# Patient Record
Sex: MALE | Race: WHITE | Employment: FULL TIME | URBAN - METROPOLITAN AREA
[De-identification: names, ages, dates, MRNs, and addresses within clinical notes are randomized per-mention and may not be internally consistent; named-entity substitution may affect disease eponyms.]

---

## 2017-03-27 ENCOUNTER — PROCEDURE VISIT (OUTPATIENT)
Dept: CARDIOLOGY CLINIC | Age: 52
End: 2017-03-27

## 2017-03-27 DIAGNOSIS — I50.22 CHRONIC SYSTOLIC HEART FAILURE (HCC): ICD-10-CM

## 2017-03-27 DIAGNOSIS — I42.9 CARDIOMYOPATHY (HCC): Chronic | ICD-10-CM

## 2017-03-27 DIAGNOSIS — Z95.810 AUTOMATIC IMPLANTABLE CARDIOVERTER-DEFIBRILLATOR IN SITU: Primary | ICD-10-CM

## 2017-03-27 PROCEDURE — 93296 REM INTERROG EVL PM/IDS: CPT | Performed by: INTERNAL MEDICINE

## 2017-03-27 PROCEDURE — 93297 REM INTERROG DEV EVAL ICPMS: CPT | Performed by: INTERNAL MEDICINE

## 2017-03-27 PROCEDURE — 93295 DEV INTERROG REMOTE 1/2/MLT: CPT | Performed by: INTERNAL MEDICINE

## 2017-06-27 ENCOUNTER — NURSE ONLY (OUTPATIENT)
Dept: CARDIOLOGY CLINIC | Age: 52
End: 2017-06-27

## 2017-06-27 DIAGNOSIS — I42.9 CARDIOMYOPATHY (HCC): Chronic | ICD-10-CM

## 2017-06-27 DIAGNOSIS — I50.22 CHRONIC SYSTOLIC CONGESTIVE HEART FAILURE (HCC): ICD-10-CM

## 2017-06-27 DIAGNOSIS — Z95.810 AUTOMATIC IMPLANTABLE CARDIOVERTER-DEFIBRILLATOR IN SITU: Primary | ICD-10-CM

## 2017-10-09 ENCOUNTER — PROCEDURE VISIT (OUTPATIENT)
Dept: CARDIOLOGY CLINIC | Age: 52
End: 2017-10-09

## 2017-10-09 DIAGNOSIS — Z95.810 AUTOMATIC IMPLANTABLE CARDIOVERTER-DEFIBRILLATOR IN SITU: Primary | ICD-10-CM

## 2017-10-09 DIAGNOSIS — I42.9 CARDIOMYOPATHY, UNSPECIFIED TYPE (HCC): Chronic | ICD-10-CM

## 2017-10-09 DIAGNOSIS — I50.22 CHRONIC SYSTOLIC HEART FAILURE (HCC): ICD-10-CM

## 2017-10-09 PROCEDURE — 93296 REM INTERROG EVL PM/IDS: CPT | Performed by: INTERNAL MEDICINE

## 2017-10-09 PROCEDURE — 93295 DEV INTERROG REMOTE 1/2/MLT: CPT | Performed by: INTERNAL MEDICINE

## 2017-10-09 PROCEDURE — 93297 REM INTERROG DEV EVAL ICPMS: CPT | Performed by: INTERNAL MEDICINE

## 2017-10-16 ENCOUNTER — TELEPHONE (OUTPATIENT)
Dept: CARDIOLOGY CLINIC | Age: 52
End: 2017-10-16

## 2017-10-16 NOTE — TELEPHONE ENCOUNTER
JACOB for patient that JERRICA has open appts this year in his [facility] office .  If he would like his appt in [facility] or [facility] just let use know and we will put him on a waiting list .

## 2017-10-16 NOTE — TELEPHONE ENCOUNTER
Needs appt in [facility] office for his yearly check up in December or January . Offered sooner but he works out of town .

## 2017-12-21 ENCOUNTER — PROCEDURE VISIT (OUTPATIENT)
Dept: CARDIOLOGY CLINIC | Age: 52
End: 2017-12-21

## 2017-12-21 ENCOUNTER — OFFICE VISIT (OUTPATIENT)
Dept: CARDIOLOGY CLINIC | Age: 52
End: 2017-12-21

## 2017-12-21 VITALS
BODY MASS INDEX: 26.72 KG/M2 | DIASTOLIC BLOOD PRESSURE: 80 MMHG | SYSTOLIC BLOOD PRESSURE: 110 MMHG | HEART RATE: 60 BPM | WEIGHT: 186.2 LBS

## 2017-12-21 DIAGNOSIS — I50.22 CHRONIC SYSTOLIC HEART FAILURE (HCC): ICD-10-CM

## 2017-12-21 DIAGNOSIS — I47.29 PAROXYSMAL VENTRICULAR TACHYCARDIA: Primary | ICD-10-CM

## 2017-12-21 DIAGNOSIS — I50.9 CONGESTIVE HEART FAILURE, UNSPECIFIED CONGESTIVE HEART FAILURE CHRONICITY, UNSPECIFIED CONGESTIVE HEART FAILURE TYPE: ICD-10-CM

## 2017-12-21 DIAGNOSIS — Z95.810 AUTOMATIC IMPLANTABLE CARDIOVERTER-DEFIBRILLATOR IN SITU: ICD-10-CM

## 2017-12-21 PROCEDURE — G8484 FLU IMMUNIZE NO ADMIN: HCPCS | Performed by: INTERNAL MEDICINE

## 2017-12-21 PROCEDURE — 3017F COLORECTAL CA SCREEN DOC REV: CPT | Performed by: INTERNAL MEDICINE

## 2017-12-21 PROCEDURE — G8419 CALC BMI OUT NRM PARAM NOF/U: HCPCS | Performed by: INTERNAL MEDICINE

## 2017-12-21 PROCEDURE — 93284 PRGRMG EVAL IMPLANTABLE DFB: CPT | Performed by: INTERNAL MEDICINE

## 2017-12-21 PROCEDURE — G8427 DOCREV CUR MEDS BY ELIG CLIN: HCPCS | Performed by: INTERNAL MEDICINE

## 2017-12-21 PROCEDURE — 93290 INTERROG DEV EVAL ICPMS IP: CPT | Performed by: INTERNAL MEDICINE

## 2017-12-21 PROCEDURE — 99214 OFFICE O/P EST MOD 30 MIN: CPT | Performed by: INTERNAL MEDICINE

## 2017-12-21 PROCEDURE — 1036F TOBACCO NON-USER: CPT | Performed by: INTERNAL MEDICINE

## 2017-12-21 RX ORDER — CARVEDILOL 12.5 MG/1
TABLET ORAL
Qty: 60 TABLET | Refills: 5 | Status: SHIPPED | OUTPATIENT
Start: 2017-12-21 | End: 2018-03-19 | Stop reason: SDUPTHER

## 2017-12-21 NOTE — PROGRESS NOTES
[address]          Electrophysiology Follow Up      Chief Concerns: NICM, s/p SCA, s/p [employer] Edwin Bi-V ICD    HPI: Lawanda Castro is a 47 y.o. male presenting in the office today for NICM, CHF, ICD follow up. He has hx of cardiac arrest in [place] [facility] with subsequent BiV ICD implantation, HLD, & HTN. Angiogram on 04/20/12 showed normal coronaries. MRI on 04/24/12 showed IDCM, no late enhancement. Echo 6/12/14 showed EF 55%. He travels domestic as well as international frequently due to work. Since the ICD implant, his cardiac function has improved and done well overall. Device is interrogated today. Device is functioning normally. No episode of atrial or ventricular arrhythmia noted since last visit. Est battery life is about 10.5 months. He reports he has felt dizziness with standing, daniel in the evening after work out in past 3-4 month. He also stated he has hydrated himself well daily. He has become more active since this summer. He has lost some weight to achieve healthy lifestyle. He checked his blood pressure while he experienced one of his dizziness spells was around 95/60 mmHg (sitting). He stated his lightheadedness has occurred few times a week and when he stands up. He is currently takes Coreg 25 mg BID, Lisinopril 5 mg QD, ASA 81 mg QD, & Lipitor 40 mg QD. He denies CP, SOB, fatigue, palpitations, or syncope. Past Medical History:   Diagnosis Date    Cardiomyopathy ([address]     Paroxysmal ventricular tachycardia (HCC)      Past Surgical History:   Procedure Laterality Date    HAND SURGERY      2/2015    PACEMAKER INSERTION  04/25/2012    TONSILLECTOMY AND ADENOIDECTOMY      childhood     Family History   Problem Relation Age of Onset    Cancer Mother     Cancer Father      Social History     Social History    Marital status:      Spouse name: N/A    Number of children: N/A    Years of education: N/A     Occupational History    Not on file.      Social History Main Topics    Smoking status: Former Smoker     Quit date: 8/6/2007    Smokeless tobacco: Never Used    Alcohol use Yes      Comment: socially    Drug use: No    Sexual activity: Yes     Partners: Female     Other Topics Concern    Not on file     Social History Narrative    No narrative on file       Review of Systems   Constitutional: Negative. HENT: Negative. Eyes: Negative. Respiratory: Negative. Cardiovascular: Negative for palps. Gastrointestinal: Negative. Genitourinary: Negative. Musculoskeletal: Negative. Skin: Negative. Neurological: Negative. Psychiatric/Behavioral: Negative. Current Outpatient Prescriptions   Medication Sig Dispense Refill    carvedilol (COREG) 25 MG tablet TAKE ONE TABLET BY MOUTH TWICE A DAY WITH MEALS 180 tablet 3    lisinopril (PRINIVIL;ZESTRIL) 5 MG tablet Take 1 tablet by mouth daily 90 tablet 3    atorvastatin (LIPITOR) 40 MG tablet Take 1 tablet by mouth daily 30 tablet 11    aspirin 81 MG tablet Take 1 tablet by mouth daily. 30 tablet 11     No current facility-administered medications for this visit. Objective:   Physical Exam   Constitutional: He is oriented to person, place, and time. He appears well-developed and well-nourished. HENT:   Head: Normocephalic and atraumatic. Eyes: Pupils are equal, round, and reactive to light. Neck: Normal range of motion. Cardiovascular: Normal rate, regular rhythm S4 gallop    Pulmonary/Chest: Effort normal and breath sounds normal.   Abdominal: Soft. There is no tenderness. Musculoskeletal: Normal range of motion. He exhibits no edema. Neurological: He is alert and oriented to person, place, and time. Skin: Skin is warm and dry. Psychiatric: He has a normal mood and affect. /80   Pulse 60   Wt 186 lb 3.2 oz (84.5 kg)   BMI 26.72 kg/m²     EKG 12/21/17. Personally reviewed.     6/12/14 Echo  Summary  Normal left ventricle size, wall thickness and systolic function

## 2017-12-26 RX ORDER — LISINOPRIL 5 MG/1
TABLET ORAL
Qty: 30 TABLET | Refills: 5 | Status: SHIPPED | OUTPATIENT
Start: 2017-12-26 | End: 2017-12-27 | Stop reason: SDUPTHER

## 2017-12-27 RX ORDER — CARVEDILOL 25 MG/1
TABLET ORAL
Qty: 60 TABLET | Refills: 2 | Status: SHIPPED | OUTPATIENT
Start: 2017-12-27 | End: 2018-03-21 | Stop reason: SDUPTHER

## 2017-12-27 RX ORDER — LISINOPRIL 5 MG/1
TABLET ORAL
Qty: 30 TABLET | Refills: 5 | Status: SHIPPED | OUTPATIENT
Start: 2017-12-27 | End: 2018-03-19 | Stop reason: SDUPTHER

## 2018-03-16 ENCOUNTER — HOSPITAL ENCOUNTER (OUTPATIENT)
Dept: NON INVASIVE DIAGNOSTICS | Age: 53
Discharge: HOME | End: 2018-03-16
Attending: INTERNAL MEDICINE | Admitting: INTERNAL MEDICINE

## 2018-03-16 DIAGNOSIS — I50.9 HEART FAILURE (HCC): ICD-10-CM

## 2018-03-16 LAB
LV EF: 48 %
LVEF MODALITY: NORMAL

## 2018-03-19 ENCOUNTER — TELEPHONE (OUTPATIENT)
Dept: CARDIOLOGY CLINIC | Age: 53
End: 2018-03-19

## 2018-03-19 DIAGNOSIS — I50.9 CONGESTIVE HEART FAILURE, UNSPECIFIED CONGESTIVE HEART FAILURE CHRONICITY, UNSPECIFIED CONGESTIVE HEART FAILURE TYPE: ICD-10-CM

## 2018-03-19 DIAGNOSIS — I10 ESSENTIAL HYPERTENSION: Primary | ICD-10-CM

## 2018-03-19 DIAGNOSIS — I47.29 PAROXYSMAL VENTRICULAR TACHYCARDIA: ICD-10-CM

## 2018-03-19 NOTE — TELEPHONE ENCOUNTER
Pt calling for the results of his ECHO? Pt also switched insurance company and needs new scripts for Rx lisinopril (PRINIVIL;ZESTRIL) 5 MG tablet, carvedilol (COREG) 25 MG tablet called into [facility] for refills.  Pls call to advise Thank you

## 2018-03-21 ENCOUNTER — NURSE ONLY (OUTPATIENT)
Dept: CARDIOLOGY CLINIC | Age: 53
End: 2018-03-21

## 2018-03-21 RX ORDER — CARVEDILOL 25 MG/1
25 TABLET ORAL 2 TIMES DAILY
Qty: 180 TABLET | Refills: 2 | Status: SHIPPED | OUTPATIENT
Start: 2018-03-21 | End: 2018-11-14 | Stop reason: SDUPTHER

## 2018-03-21 RX ORDER — LISINOPRIL 5 MG/1
TABLET ORAL
Qty: 90 TABLET | Refills: 2 | Status: SHIPPED | OUTPATIENT
Start: 2018-03-21 | End: 2018-11-14 | Stop reason: SDUPTHER

## 2018-04-03 ENCOUNTER — NURSE ONLY (OUTPATIENT)
Dept: CARDIOLOGY CLINIC | Age: 53
End: 2018-04-03

## 2018-04-03 DIAGNOSIS — I50.22 CHRONIC SYSTOLIC HEART FAILURE (HCC): ICD-10-CM

## 2018-04-03 DIAGNOSIS — Z95.810 AUTOMATIC IMPLANTABLE CARDIOVERTER-DEFIBRILLATOR IN SITU: ICD-10-CM

## 2018-04-03 PROCEDURE — 93296 REM INTERROG EVL PM/IDS: CPT | Performed by: INTERNAL MEDICINE

## 2018-04-03 PROCEDURE — 93297 REM INTERROG DEV EVAL ICPMS: CPT | Performed by: INTERNAL MEDICINE

## 2018-04-03 PROCEDURE — 93295 DEV INTERROG REMOTE 1/2/MLT: CPT | Performed by: INTERNAL MEDICINE

## 2018-07-24 ENCOUNTER — NURSE ONLY (OUTPATIENT)
Dept: CARDIOLOGY CLINIC | Age: 53
End: 2018-07-24

## 2018-07-24 DIAGNOSIS — I50.22 CHRONIC SYSTOLIC HEART FAILURE (HCC): ICD-10-CM

## 2018-07-24 DIAGNOSIS — Z95.810 AUTOMATIC IMPLANTABLE CARDIOVERTER-DEFIBRILLATOR IN SITU: ICD-10-CM

## 2018-07-24 PROCEDURE — 93297 REM INTERROG DEV EVAL ICPMS: CPT | Performed by: INTERNAL MEDICINE

## 2018-07-24 PROCEDURE — 93296 REM INTERROG EVL PM/IDS: CPT | Performed by: INTERNAL MEDICINE

## 2018-07-24 PROCEDURE — 93295 DEV INTERROG REMOTE 1/2/MLT: CPT | Performed by: INTERNAL MEDICINE

## 2018-07-31 ENCOUNTER — TELEPHONE (OUTPATIENT)
Dept: CARDIOLOGY CLINIC | Age: 53
End: 2018-07-31

## 2018-07-31 NOTE — TELEPHONE ENCOUNTER
Pt returning [employer], CORIE Mercado, call. Natali Henriquez currently unavailable at the moment. Pt asked to be called back on his cell:    [phone].

## 2018-09-25 ENCOUNTER — TELEPHONE (OUTPATIENT)
Dept: CARDIOLOGY CLINIC | Age: 53
End: 2018-09-25

## 2018-09-25 NOTE — TELEPHONE ENCOUNTER
Spoke with pt. Likely YAZMIN. Pt to send remote Friday or over weekend (currently in [place]) to verify RRT then will set up ICD replacement procedure after OV currently scheduled with Dr Chanell Chapman 11/5/18.

## 2018-11-05 ENCOUNTER — TELEPHONE (OUTPATIENT)
Dept: CARDIOLOGY CLINIC | Age: 53
End: 2018-11-05

## 2018-11-05 NOTE — TELEPHONE ENCOUNTER
Please call patient at [phone] to go over procedure. He was upset appointment was cancelled and will not make next weeks wound check.

## 2018-11-06 ENCOUNTER — PROCEDURE VISIT (OUTPATIENT)
Dept: CARDIOLOGY CLINIC | Age: 53
End: 2018-11-06
Payer: COMMERCIAL

## 2018-11-06 ENCOUNTER — OFFICE VISIT (OUTPATIENT)
Dept: CARDIOLOGY CLINIC | Age: 53
End: 2018-11-06
Payer: COMMERCIAL

## 2018-11-06 VITALS
BODY MASS INDEX: 27.66 KG/M2 | OXYGEN SATURATION: 93 % | HEIGHT: 70 IN | DIASTOLIC BLOOD PRESSURE: 70 MMHG | HEART RATE: 66 BPM | WEIGHT: 193.2 LBS | SYSTOLIC BLOOD PRESSURE: 100 MMHG

## 2018-11-06 DIAGNOSIS — Z95.810 AUTOMATIC IMPLANTABLE CARDIOVERTER-DEFIBRILLATOR IN SITU: ICD-10-CM

## 2018-11-06 DIAGNOSIS — I50.22 CHRONIC SYSTOLIC HEART FAILURE (HCC): ICD-10-CM

## 2018-11-06 DIAGNOSIS — Z95.810 AUTOMATIC IMPLANTABLE CARDIOVERTER-DEFIBRILLATOR IN SITU: Primary | ICD-10-CM

## 2018-11-06 PROCEDURE — 3017F COLORECTAL CA SCREEN DOC REV: CPT | Performed by: INTERNAL MEDICINE

## 2018-11-06 PROCEDURE — 1036F TOBACCO NON-USER: CPT | Performed by: INTERNAL MEDICINE

## 2018-11-06 PROCEDURE — 93284 PRGRMG EVAL IMPLANTABLE DFB: CPT | Performed by: INTERNAL MEDICINE

## 2018-11-06 PROCEDURE — G8427 DOCREV CUR MEDS BY ELIG CLIN: HCPCS | Performed by: INTERNAL MEDICINE

## 2018-11-06 PROCEDURE — 93290 INTERROG DEV EVAL ICPMS IP: CPT | Performed by: INTERNAL MEDICINE

## 2018-11-06 PROCEDURE — G8419 CALC BMI OUT NRM PARAM NOF/U: HCPCS | Performed by: INTERNAL MEDICINE

## 2018-11-06 PROCEDURE — 99214 OFFICE O/P EST MOD 30 MIN: CPT | Performed by: INTERNAL MEDICINE

## 2018-11-06 PROCEDURE — G8484 FLU IMMUNIZE NO ADMIN: HCPCS | Performed by: INTERNAL MEDICINE

## 2018-11-06 NOTE — PROGRESS NOTES
Quit date: 8/6/2007    Smokeless tobacco: Never Used    Alcohol use Yes      Comment: socially    Drug use: No    Sexual activity: Yes     Partners: Female     Other Topics Concern    Not on file     Social History Narrative    No narrative on file       Review of Systems   Constitutional: Negative. HENT: Negative. Eyes: Negative. Respiratory: Negative. Cardiovascular: Negative for palps. Gastrointestinal: Negative. Genitourinary: Negative. Musculoskeletal: Negative. Skin: Negative. Neurological: Negative. Psychiatric/Behavioral: Negative. Current Outpatient Prescriptions   Medication Sig Dispense Refill    Multiple Vitamins-Minerals (MULTIVITAMIN ADULTS PO) Take by mouth daily      lisinopril (PRINIVIL;ZESTRIL) 5 MG tablet TAKE ONE TABLET BY MOUTH DAILY 90 tablet 2    carvedilol (COREG) 25 MG tablet Take 1 tablet by mouth 2 times daily TAKE ONE TABLET BY MOUTH TWICE A DAY WITH MEALS 180 tablet 2    atorvastatin (LIPITOR) 40 MG tablet Take 1 tablet by mouth daily 30 tablet 11    aspirin 81 MG tablet Take 1 tablet by mouth daily. 30 tablet 11     No current facility-administered medications for this visit. Objective:   Physical Exam   Constitutional: He is oriented to person, place, and time. He appears well-developed and well-nourished. HENT:   Head: Normocephalic and atraumatic. Eyes: Pupils are equal, round, and reactive to light. Neck: Normal range of motion. Cardiovascular: Normal rate, regular rhythm S4 gallop    Pulmonary/Chest: Effort normal and breath sounds normal.   Abdominal: Soft. There is no tenderness. Musculoskeletal: Normal range of motion. He exhibits no edema. Neurological: He is alert and oriented to person, place, and time. Skin: Skin is warm and dry. Psychiatric: He has a normal mood and affect.    /70 (Site: Left Upper Arm, Position: Sitting, Cuff Size: Medium Adult)   Pulse 66   Ht 5' 10\" (1.778 m)   Wt 193 lb 3.2

## 2018-11-08 ENCOUNTER — HOSPITAL ENCOUNTER (OUTPATIENT)
Dept: CARDIAC CATH/INVASIVE PROCEDURES | Age: 53
Discharge: HOME | End: 2018-11-08
Attending: INTERNAL MEDICINE | Admitting: INTERNAL MEDICINE
Payer: COMMERCIAL

## 2018-11-08 VITALS — HEIGHT: 70 IN | WEIGHT: 183 LBS | TEMPERATURE: 97 F | BODY MASS INDEX: 26.2 KG/M2 | OXYGEN SATURATION: 97 %

## 2018-11-08 LAB
ANION GAP SERPL CALCULATED.3IONS-SCNC: 12 MMOL/L (ref 3–16)
BUN BLDV-MCNC: 15 MG/DL (ref 7–20)
CALCIUM SERPL-MCNC: 10 MG/DL (ref 8.3–10.6)
CHLORIDE BLD-SCNC: 101 MMOL/L (ref 99–110)
CO2: 25 MMOL/L (ref 21–32)
CREAT SERPL-MCNC: 0.7 MG/DL (ref 0.9–1.3)
GFR AFRICAN AMERICAN: >60
GFR NON-AFRICAN AMERICAN: >60
GLUCOSE BLD-MCNC: 119 MG/DL (ref 70–99)
HCT VFR BLD CALC: 45.9 % (ref 40.5–52.5)
HEMOGLOBIN: 15.6 G/DL (ref 13.5–17.5)
INR BLD: 0.95 (ref 0.86–1.14)
MCH RBC QN AUTO: 29.7 PG (ref 26–34)
MCHC RBC AUTO-ENTMCNC: 34 G/DL (ref 31–36)
MCV RBC AUTO: 87.4 FL (ref 80–100)
PDW BLD-RTO: 13.3 % (ref 12.4–15.4)
PERFORMED ON: ABNORMAL
PLATELET # BLD: 151 K/UL (ref 135–450)
PMV BLD AUTO: 10.1 FL (ref 5–10.5)
POC POTASSIUM: 8.9 MMOL/L (ref 3.5–5.1)
POC SAMPLE TYPE: ABNORMAL
POTASSIUM REFLEX MAGNESIUM: 6.5 MMOL/L (ref 3.5–5.1)
POTASSIUM SERPL-SCNC: 5.3 MMOL/L (ref 3.5–5.1)
PROTHROMBIN TIME: 10.8 SEC (ref 9.8–13)
RBC # BLD: 5.26 M/UL (ref 4.2–5.9)
SODIUM BLD-SCNC: 138 MMOL/L (ref 136–145)
WBC # BLD: 5.2 K/UL (ref 4–11)

## 2018-11-08 PROCEDURE — 36415 COLL VENOUS BLD VENIPUNCTURE: CPT

## 2018-11-08 PROCEDURE — 99152 MOD SED SAME PHYS/QHP 5/>YRS: CPT | Performed by: INTERNAL MEDICINE

## 2018-11-08 PROCEDURE — 93010 ELECTROCARDIOGRAM REPORT: CPT | Performed by: INTERNAL MEDICINE

## 2018-11-08 PROCEDURE — C1882 AICD, OTHER THAN SING/DUAL: HCPCS

## 2018-11-08 PROCEDURE — C1781 MESH (IMPLANTABLE): HCPCS

## 2018-11-08 PROCEDURE — 84132 ASSAY OF SERUM POTASSIUM: CPT

## 2018-11-08 PROCEDURE — 93005 ELECTROCARDIOGRAM TRACING: CPT | Performed by: INTERNAL MEDICINE

## 2018-11-08 PROCEDURE — 94664 DEMO&/EVAL PT USE INHALER: CPT

## 2018-11-08 PROCEDURE — 99153 MOD SED SAME PHYS/QHP EA: CPT | Performed by: INTERNAL MEDICINE

## 2018-11-08 PROCEDURE — 94770 HC ETCO2 MONITOR DAILY: CPT

## 2018-11-08 PROCEDURE — 85027 COMPLETE CBC AUTOMATED: CPT

## 2018-11-08 PROCEDURE — 80048 BASIC METABOLIC PNL TOTAL CA: CPT

## 2018-11-08 PROCEDURE — 33264 RMVL & RPLCMT DFB GEN MLT LD: CPT | Performed by: INTERNAL MEDICINE

## 2018-11-08 PROCEDURE — 2700000000 HC OXYGEN THERAPY PER DAY

## 2018-11-08 PROCEDURE — 85610 PROTHROMBIN TIME: CPT

## 2018-11-08 PROCEDURE — 94760 N-INVAS EAR/PLS OXIMETRY 1: CPT

## 2018-11-08 RX ORDER — SODIUM CHLORIDE 0.9 % (FLUSH) 0.9 %
10 SYRINGE (ML) INJECTION PRN
Status: DISCONTINUED | OUTPATIENT
Start: 2018-11-08 | End: 2018-11-08 | Stop reason: HOSPADM

## 2018-11-08 RX ORDER — SODIUM CHLORIDE 0.9 % (FLUSH) 0.9 %
10 SYRINGE (ML) INJECTION EVERY 12 HOURS SCHEDULED
Status: DISCONTINUED | OUTPATIENT
Start: 2018-11-08 | End: 2018-11-08 | Stop reason: HOSPADM

## 2018-11-08 RX ORDER — CEFAZOLIN SODIUM 1 G/3ML
2 INJECTION, POWDER, FOR SOLUTION INTRAMUSCULAR; INTRAVENOUS ONCE
Status: DISCONTINUED | OUTPATIENT
Start: 2018-11-08 | End: 2018-11-08

## 2018-11-08 RX ORDER — SODIUM CHLORIDE 9 MG/ML
INJECTION, SOLUTION INTRAVENOUS CONTINUOUS
Status: DISCONTINUED | OUTPATIENT
Start: 2018-11-08 | End: 2018-11-08 | Stop reason: HOSPADM

## 2018-11-08 RX ORDER — CEFAZOLIN SODIUM 1 G/3ML
1 INJECTION, POWDER, FOR SOLUTION INTRAMUSCULAR; INTRAVENOUS ONCE
Status: DISCONTINUED | OUTPATIENT
Start: 2018-11-08 | End: 2018-11-08

## 2018-11-08 NOTE — PROGRESS NOTES
ETCO2  Monitoring done for conscious sedation. Patient is on 3 liters/min via nasal cannula for procedure. Baseline information:  HR: 70 BP: 138/102  RR: 18 LOC: alert  ETCO2: 34 SpO2: 97    5 minutes after sedation administered:  HR: 76 BP: 138/102  RR: 20 LOC: lethargic  ETCO2: 36 SpO2: 98    10 min after sedation administered:  HR: 78 BP: 133/93  RR: 18 LOC: lethargic  ETCO2: 27 SpO2: 94    Pt procedure on pause while waiting for sedation. Pt awake while waiting.     5 minutes after sedation administered:  HR: 76 BP: 143/99  RR: 12 LOC:lethargic  ETCO2: 26 SpO2: 97    10 minutes after sedation administered:  HR: 48 BP: 123/89  RR: 20 LOC: lethargic  ETCO2: 29 SpO2: 96      Post-Procedure:  HR: 74 BP: 148/109  RR: 23 LOC: alert  ETCO2: 33 SpO2: 95    well                      Patient/caregiver was educated on the proper method of use:  Yes      Level of patient/caregiver understanding able to:   [x] Verbalize understanding   [] Demonstrate understanding       [] Teach back        [] Needs reinforcement       []  No available caregiver               []  Other:     Response to education:  Excellent

## 2018-11-12 LAB
EKG ATRIAL RATE: 64 BPM
EKG DIAGNOSIS: NORMAL
EKG P AXIS: 70 DEGREES
EKG Q-T INTERVAL: 446 MS
EKG QRS DURATION: 152 MS
EKG QTC CALCULATION (BAZETT): 460 MS
EKG R AXIS: -33 DEGREES
EKG T AXIS: 200 DEGREES
EKG VENTRICULAR RATE: 64 BPM

## 2018-11-14 DIAGNOSIS — I47.29 PAROXYSMAL VENTRICULAR TACHYCARDIA: ICD-10-CM

## 2018-11-14 DIAGNOSIS — I10 ESSENTIAL HYPERTENSION: ICD-10-CM

## 2018-11-14 RX ORDER — LISINOPRIL 5 MG/1
TABLET ORAL
Qty: 90 TABLET | Refills: 3 | Status: SHIPPED | OUTPATIENT
Start: 2018-11-14 | End: 2019-09-23 | Stop reason: SDUPTHER

## 2018-11-14 RX ORDER — CARVEDILOL 25 MG/1
25 TABLET ORAL 2 TIMES DAILY
Qty: 180 TABLET | Refills: 3 | Status: SHIPPED | OUTPATIENT
Start: 2018-11-14 | End: 2019-09-23 | Stop reason: SDUPTHER

## 2018-11-19 ENCOUNTER — PROCEDURE VISIT (OUTPATIENT)
Dept: CARDIOLOGY CLINIC | Age: 53
End: 2018-11-19
Payer: COMMERCIAL

## 2018-11-19 DIAGNOSIS — I50.22 CHRONIC SYSTOLIC HEART FAILURE (HCC): ICD-10-CM

## 2018-11-19 DIAGNOSIS — Z95.810 AUTOMATIC IMPLANTABLE CARDIOVERTER-DEFIBRILLATOR IN SITU: ICD-10-CM

## 2018-11-19 PROCEDURE — 93284 PRGRMG EVAL IMPLANTABLE DFB: CPT | Performed by: INTERNAL MEDICINE

## 2018-12-19 NOTE — PROGRESS NOTES
echo in 2014  - Echo In March 2019 before he sees Dr. Jeff Gutierrez  -  paced  - NYHA class I  - On carvedilol 25 mg BID, lisinopril 5 mg daily  - S/p [facility] CRT-D (2012, [place])  - Devic check today shows  7 percent AP, 98% biventricular pacing, no arrhythmias, other parameters stable. - ECG ordered and results personally reviewed     EF of 53-21%  ACEi for systolic HF  ASA and Statin for CAD  No known AF   No Tobacco use. All questions and concerns were addressed to the patient/family. Alternatives to my treatment were discussed. The note was completed using EMR. Every effort was made to ensure accuracy; however, inadvertent computerized transcription errors may be present. Patient received education regarding their diagnosis, treatment and medications while in the office today.       Sejal Cash [address]

## 2018-12-20 ENCOUNTER — PROCEDURE VISIT (OUTPATIENT)
Dept: CARDIOLOGY CLINIC | Age: 53
End: 2018-12-20
Payer: COMMERCIAL

## 2018-12-20 ENCOUNTER — OFFICE VISIT (OUTPATIENT)
Dept: CARDIOLOGY CLINIC | Age: 53
End: 2018-12-20
Payer: COMMERCIAL

## 2018-12-20 VITALS
BODY MASS INDEX: 27.55 KG/M2 | OXYGEN SATURATION: 93 % | HEART RATE: 60 BPM | SYSTOLIC BLOOD PRESSURE: 124 MMHG | DIASTOLIC BLOOD PRESSURE: 70 MMHG | WEIGHT: 192 LBS

## 2018-12-20 DIAGNOSIS — I42.8 NONISCHEMIC CARDIOMYOPATHY (HCC): Primary | ICD-10-CM

## 2018-12-20 DIAGNOSIS — Z95.810 AUTOMATIC IMPLANTABLE CARDIOVERTER-DEFIBRILLATOR IN SITU: ICD-10-CM

## 2018-12-20 DIAGNOSIS — Z95.810 ICD (IMPLANTABLE CARDIOVERTER-DEFIBRILLATOR), BIVENTRICULAR, IN SITU: ICD-10-CM

## 2018-12-20 DIAGNOSIS — I47.29 NONSUSTAINED VENTRICULAR TACHYCARDIA: ICD-10-CM

## 2018-12-20 DIAGNOSIS — I50.22 CHRONIC SYSTOLIC HEART FAILURE (HCC): ICD-10-CM

## 2018-12-20 PROCEDURE — G8427 DOCREV CUR MEDS BY ELIG CLIN: HCPCS | Performed by: NURSE PRACTITIONER

## 2018-12-20 PROCEDURE — G8419 CALC BMI OUT NRM PARAM NOF/U: HCPCS | Performed by: NURSE PRACTITIONER

## 2018-12-20 PROCEDURE — 1036F TOBACCO NON-USER: CPT | Performed by: NURSE PRACTITIONER

## 2018-12-20 PROCEDURE — 93284 PRGRMG EVAL IMPLANTABLE DFB: CPT | Performed by: INTERNAL MEDICINE

## 2018-12-20 PROCEDURE — 93290 INTERROG DEV EVAL ICPMS IP: CPT | Performed by: INTERNAL MEDICINE

## 2018-12-20 PROCEDURE — 93000 ELECTROCARDIOGRAM COMPLETE: CPT | Performed by: NURSE PRACTITIONER

## 2018-12-20 PROCEDURE — G8484 FLU IMMUNIZE NO ADMIN: HCPCS | Performed by: NURSE PRACTITIONER

## 2018-12-20 PROCEDURE — 3017F COLORECTAL CA SCREEN DOC REV: CPT | Performed by: NURSE PRACTITIONER

## 2018-12-20 PROCEDURE — 99214 OFFICE O/P EST MOD 30 MIN: CPT | Performed by: NURSE PRACTITIONER

## 2019-03-13 ENCOUNTER — HOSPITAL ENCOUNTER (OUTPATIENT)
Dept: NON INVASIVE DIAGNOSTICS | Age: 54
Discharge: HOME | End: 2019-03-13
Payer: COMMERCIAL

## 2019-03-13 DIAGNOSIS — Z95.810 ICD (IMPLANTABLE CARDIOVERTER-DEFIBRILLATOR), BIVENTRICULAR, IN SITU: ICD-10-CM

## 2019-03-13 DIAGNOSIS — I47.29 NONSUSTAINED VENTRICULAR TACHYCARDIA: ICD-10-CM

## 2019-03-13 DIAGNOSIS — I42.8 NONISCHEMIC CARDIOMYOPATHY (HCC): ICD-10-CM

## 2019-03-13 LAB
LV EF: 50 %
LVEF MODALITY: NORMAL

## 2019-03-13 PROCEDURE — 93306 TTE W/DOPPLER COMPLETE: CPT

## 2019-04-02 NOTE — PROGRESS NOTES
Last attempt to quit: 2007     Years since quittin.6    Smokeless tobacco: Never Used   Substance and Sexual Activity    Alcohol use: Yes     Comment: socially    Drug use: No    Sexual activity: Yes     Partners: Female   Lifestyle    Physical activity:     Days per week: Not on file     Minutes per session: Not on file    Stress: Not on file   Relationships    Social connections:     Talks on phone: Not on file     Gets together: Not on file     Attends Yazdanism service: Not on file     Active member of club or organization: Not on file     Attends meetings of clubs or organizations: Not on file     Relationship status: Not on file    Intimate partner violence:     Fear of current or ex partner: Not on file     Emotionally abused: Not on file     Physically abused: Not on file     Forced sexual activity: Not on file   Other Topics Concern    Not on file   Social History Narrative    Not on file       Review of Systems   Constitutional: Negative. HENT: Negative. Eyes: Negative. Respiratory: Negative. Cardiovascular: Negative for palps. Gastrointestinal: Negative. Genitourinary: Negative. Musculoskeletal: Negative. Skin: Negative. Neurological: Negative. Psychiatric/Behavioral: Negative. Current Outpatient Medications   Medication Sig Dispense Refill    lisinopril (PRINIVIL;ZESTRIL) 5 MG tablet TAKE ONE TABLET BY MOUTH DAILY 90 tablet 3    carvedilol (COREG) 25 MG tablet Take 1 tablet by mouth 2 times daily TAKE ONE TABLET BY MOUTH TWICE A DAY WITH MEALS 180 tablet 3    Multiple Vitamins-Minerals (MULTIVITAMIN ADULTS PO) Take by mouth daily      atorvastatin (LIPITOR) 40 MG tablet Take 1 tablet by mouth daily 30 tablet 11    aspirin 81 MG tablet Take 1 tablet by mouth daily. 30 tablet 11     No current facility-administered medications for this visit. Objective:   Physical Exam   Constitutional: He is oriented to person, place, and time.  He appears well-developed and well-nourished. HENT:   Head: Normocephalic and atraumatic. Eyes: Pupils are equal, round, and reactive to light. Neck: Normal range of motion. Cardiovascular: Normal rate, regular rhythm S4 gallop    Pulmonary/Chest: Effort normal and breath sounds normal.   Abdominal: Soft. There is no tenderness. Musculoskeletal: Normal range of motion. He exhibits no edema. Neurological: He is alert and oriented to person, place, and time. Skin: Skin is warm and dry. Psychiatric: He has a normal mood and affect. /60   Pulse 73   Ht 5' 10\" (1.778 m)   Wt 193 lb 9.6 oz (87.8 kg)   BMI 27.78 kg/m²     ECG 4/4/19, Personally reviewed. Echo 3/13/19  Summary   Left ventricular cavity size is normal.   There is asymmetric hypertrophy of the septum with normal LV systolic   function.   Ejection fraction is visually estimated to be 50%.   Normal diastolic function.   Pacer / ICD wire is visualized in the right ventricle.   The right ventricle is borderline enlarged with normal function.   Mild tricuspid regurgitation.       Assessment:      1. NICM- improved. Echo (6/12/14) EF 55%. On Coreg 25 mg BID, Lisinopril 5 mg QD. 2. S/P SCA. 3. Bi-V-ICD- with normal function. S/p generator change 11/2018, healing well. Plan:      1. Continue meds as ordered  2. Continue activity as tolerated  3. Continue with remote home transmissions every 3 months  4. Follow up in 1 year with device check      I, Rula Valenzuela RN, am scribing for and in the presence of Dr. Bandar Cool. 04/04/19 10:31 AM  Rula Valenzuela RN    I, Dr. Bandar Cool, personally performed the services described in this documentation as scribed by Rula Valenzuela RN in my presence, and it is both accurate and complete.       Madelin Mario M.D.

## 2019-04-04 ENCOUNTER — OFFICE VISIT (OUTPATIENT)
Dept: CARDIOLOGY CLINIC | Age: 54
End: 2019-04-04
Payer: COMMERCIAL

## 2019-04-04 ENCOUNTER — PROCEDURE VISIT (OUTPATIENT)
Dept: CARDIOLOGY CLINIC | Age: 54
End: 2019-04-04
Payer: COMMERCIAL

## 2019-04-04 VITALS
SYSTOLIC BLOOD PRESSURE: 120 MMHG | HEIGHT: 70 IN | WEIGHT: 193.6 LBS | BODY MASS INDEX: 27.72 KG/M2 | HEART RATE: 73 BPM | DIASTOLIC BLOOD PRESSURE: 60 MMHG

## 2019-04-04 DIAGNOSIS — Z95.810 ICD (IMPLANTABLE CARDIOVERTER-DEFIBRILLATOR), BIVENTRICULAR, IN SITU: ICD-10-CM

## 2019-04-04 DIAGNOSIS — Z95.810 AUTOMATIC IMPLANTABLE CARDIOVERTER-DEFIBRILLATOR IN SITU: ICD-10-CM

## 2019-04-04 DIAGNOSIS — I50.22 CHRONIC SYSTOLIC HEART FAILURE (HCC): ICD-10-CM

## 2019-04-04 DIAGNOSIS — I10 ESSENTIAL HYPERTENSION: ICD-10-CM

## 2019-04-04 DIAGNOSIS — I50.22 CHRONIC SYSTOLIC CONGESTIVE HEART FAILURE (HCC): ICD-10-CM

## 2019-04-04 DIAGNOSIS — I47.29 NONSUSTAINED VENTRICULAR TACHYCARDIA: ICD-10-CM

## 2019-04-04 DIAGNOSIS — I42.8 NONISCHEMIC CARDIOMYOPATHY (HCC): Primary | ICD-10-CM

## 2019-04-04 PROCEDURE — 93290 INTERROG DEV EVAL ICPMS IP: CPT | Performed by: INTERNAL MEDICINE

## 2019-04-04 PROCEDURE — G8419 CALC BMI OUT NRM PARAM NOF/U: HCPCS | Performed by: INTERNAL MEDICINE

## 2019-04-04 PROCEDURE — 93000 ELECTROCARDIOGRAM COMPLETE: CPT | Performed by: INTERNAL MEDICINE

## 2019-04-04 PROCEDURE — G8427 DOCREV CUR MEDS BY ELIG CLIN: HCPCS | Performed by: INTERNAL MEDICINE

## 2019-04-04 PROCEDURE — 93284 PRGRMG EVAL IMPLANTABLE DFB: CPT | Performed by: INTERNAL MEDICINE

## 2019-04-04 PROCEDURE — 1036F TOBACCO NON-USER: CPT | Performed by: INTERNAL MEDICINE

## 2019-04-04 PROCEDURE — 3017F COLORECTAL CA SCREEN DOC REV: CPT | Performed by: INTERNAL MEDICINE

## 2019-04-04 PROCEDURE — 99214 OFFICE O/P EST MOD 30 MIN: CPT | Performed by: INTERNAL MEDICINE

## 2019-06-19 ENCOUNTER — TELEPHONE (OUTPATIENT)
Dept: CARDIOLOGY CLINIC | Age: 54
End: 2019-06-19

## 2019-07-02 ENCOUNTER — NURSE ONLY (OUTPATIENT)
Dept: CARDIOLOGY CLINIC | Age: 54
End: 2019-07-02
Payer: COMMERCIAL

## 2019-07-02 DIAGNOSIS — Z95.810 AUTOMATIC IMPLANTABLE CARDIOVERTER-DEFIBRILLATOR IN SITU: ICD-10-CM

## 2019-07-02 DIAGNOSIS — I50.22 CHRONIC SYSTOLIC HEART FAILURE (HCC): ICD-10-CM

## 2019-07-02 DIAGNOSIS — I42.8 NONISCHEMIC CARDIOMYOPATHY (HCC): Primary | ICD-10-CM

## 2019-07-02 DIAGNOSIS — I47.29 NONSUSTAINED VENTRICULAR TACHYCARDIA: ICD-10-CM

## 2019-07-09 PROCEDURE — 93296 REM INTERROG EVL PM/IDS: CPT | Performed by: INTERNAL MEDICINE

## 2019-07-09 PROCEDURE — 93295 DEV INTERROG REMOTE 1/2/MLT: CPT | Performed by: INTERNAL MEDICINE

## 2019-07-09 PROCEDURE — 93297 REM INTERROG DEV EVAL ICPMS: CPT | Performed by: INTERNAL MEDICINE

## 2019-09-23 DIAGNOSIS — I10 ESSENTIAL HYPERTENSION: ICD-10-CM

## 2019-09-23 DIAGNOSIS — I47.29 PAROXYSMAL VENTRICULAR TACHYCARDIA: ICD-10-CM

## 2019-09-23 RX ORDER — LISINOPRIL 5 MG/1
TABLET ORAL
Qty: 90 TABLET | Refills: 3 | Status: SHIPPED | OUTPATIENT
Start: 2019-09-23 | End: 2020-08-27 | Stop reason: SDUPTHER

## 2019-09-23 RX ORDER — CARVEDILOL 25 MG/1
25 TABLET ORAL 2 TIMES DAILY
Qty: 180 TABLET | Refills: 3 | Status: SHIPPED | OUTPATIENT
Start: 2019-09-23 | End: 2020-08-27 | Stop reason: SDUPTHER

## 2019-10-22 ENCOUNTER — NURSE ONLY (OUTPATIENT)
Dept: CARDIOLOGY CLINIC | Age: 54
End: 2019-10-22
Payer: COMMERCIAL

## 2019-10-22 DIAGNOSIS — I42.8 NONISCHEMIC CARDIOMYOPATHY (HCC): ICD-10-CM

## 2019-10-22 DIAGNOSIS — I47.29 NONSUSTAINED VENTRICULAR TACHYCARDIA: ICD-10-CM

## 2019-10-22 DIAGNOSIS — Z95.810 AUTOMATIC IMPLANTABLE CARDIOVERTER-DEFIBRILLATOR IN SITU: ICD-10-CM

## 2019-10-22 DIAGNOSIS — I50.22 CHRONIC SYSTOLIC HEART FAILURE (HCC): ICD-10-CM

## 2019-10-22 PROCEDURE — 93296 REM INTERROG EVL PM/IDS: CPT | Performed by: INTERNAL MEDICINE

## 2019-10-22 PROCEDURE — 93297 REM INTERROG DEV EVAL ICPMS: CPT | Performed by: INTERNAL MEDICINE

## 2019-10-22 PROCEDURE — 93295 DEV INTERROG REMOTE 1/2/MLT: CPT | Performed by: INTERNAL MEDICINE

## 2020-01-21 ENCOUNTER — NURSE ONLY (OUTPATIENT)
Dept: CARDIOLOGY CLINIC | Age: 55
End: 2020-01-21
Payer: COMMERCIAL

## 2020-01-21 PROCEDURE — 93296 REM INTERROG EVL PM/IDS: CPT | Performed by: INTERNAL MEDICINE

## 2020-01-21 PROCEDURE — 93297 REM INTERROG DEV EVAL ICPMS: CPT | Performed by: INTERNAL MEDICINE

## 2020-01-21 PROCEDURE — 93295 DEV INTERROG REMOTE 1/2/MLT: CPT | Performed by: INTERNAL MEDICINE

## 2020-07-01 NOTE — PROGRESS NOTES
[address]          Electrophysiology Follow Up      Chief Concerns: NICM, s/p SCA, s/p [facility] [employer] Bi-V ICD    HPI: Peterson Campbell is a 63 y.o. male presenting in the office today for NICM, CHF, ICD follow up. PMH of HTN, HLD, and cardiac arrest in [place] with subsequent BiV ICD implantation in [address] [place] [address] in 4/2012 showed normal coronaries. MRI on 4/24/12 showed IDCM, no late enhancement. Echo 6/12/14 showed EF 55%. He travels domestically and internationally frequently due to work. Since the ICD implant, his cardiac function has improved and done well overall. S/p generator change on 11/8/18. Currently on Coreg 25 mg BID, Lisinopril 5 mg daily, ASA 81 mg daily, & Lipitor 40 mg daily. Device interrogation shows normal function of BiV ICD. BiVP 97%, AP 18%. No atrial or ventricular arrhythmias noted. YAZMIN at about 4.3 yrs. Denies complaints of palpitations, CP, SOB, orthopnea, presyncope, or syncope. He does complain of some lightheadedness in the evenings, mostly with postural changes. His SBP at those times is usually in the 90's. All other times, his SBP runs in the 110's. He is active, frequently riding his bike with an average of about 16 miles/ride. These rides include some hills and he reports good exercise tolerance. He has lost about 6 lbs over the past year.     Past Medical History:   Diagnosis Date    Cardiomyopathy ([address]     Hyperlipidemia     Hypertension     Paroxysmal ventricular tachycardia (HCC)      Past Surgical History:   Procedure Laterality Date    HAND SURGERY      2/2015    PACEMAKER INSERTION  04/25/2012    TONSILLECTOMY AND ADENOIDECTOMY      childhood     Family History   Problem Relation Age of Onset    Cancer Mother     Cancer Father      Social History     Socioeconomic History    Marital status:      Spouse name: Not on file    Number of children: Not on file    Years of education: Not on file    Highest education level: Not on file Occupational History    Not on file   Social Needs    Financial resource strain: Not on file    Food insecurity     Worry: Not on file     Inability: Not on file    Transportation needs     Medical: Not on file     Non-medical: Not on file   Tobacco Use    Smoking status: Former Smoker     Last attempt to quit: 2007     Years since quittin.9    Smokeless tobacco: Never Used   Substance and Sexual Activity    Alcohol use: Yes     Comment: socially    Drug use: No    Sexual activity: Yes     Partners: Female   Lifestyle    Physical activity     Days per week: Not on file     Minutes per session: Not on file    Stress: Not on file   Relationships    Social connections     Talks on phone: Not on file     Gets together: Not on file     Attends [employer] service: Not on file     Active member of club or organization: Not on file     Attends meetings of clubs or organizations: Not on file     Relationship status: Not on file    Intimate partner violence     Fear of current or ex partner: Not on file     Emotionally abused: Not on file     Physically abused: Not on file     Forced sexual activity: Not on file   Other Topics Concern    Not on file   Social History Narrative    Not on file       Review of Systems   Constitutional: Negative. HENT: Negative. Eyes: Negative. Respiratory: Negative. Cardiovascular: Negative for palps. Gastrointestinal: Negative. Genitourinary: Negative. Musculoskeletal: Negative. Skin: Negative. Neurological: Negative. Psychiatric/Behavioral: Negative.       Current Outpatient Medications   Medication Sig Dispense Refill    lisinopril (PRINIVIL;ZESTRIL) 5 MG tablet TAKE ONE TABLET BY MOUTH DAILY 90 tablet 3    carvedilol (COREG) 25 MG tablet Take 1 tablet by mouth 2 times daily 180 tablet 3    Multiple Vitamins-Minerals (MULTIVITAMIN ADULTS PO) Take by mouth daily      atorvastatin (LIPITOR) 40 MG tablet Take 1 tablet by mouth daily 30 tablet 11    aspirin 81 MG tablet Take 1 tablet by mouth daily. 30 tablet 11     No current facility-administered medications for this visit. Objective:   Physical Exam   Constitutional: He is oriented to person, place, and time. He appears well-developed and well-nourished. HENT:   Head: Normocephalic and atraumatic. Eyes: Pupils are equal, round, and reactive to light. Neck: Normal range of motion. Cardiovascular: Normal rate, regular rhythm S4 gallop    Pulmonary/Chest: Effort normal and breath sounds normal.   Abdominal: Soft. There is no tenderness. Musculoskeletal: Normal range of motion. He exhibits no edema. Neurological: He is alert and oriented to person, place, and time. Skin: Skin is warm and dry. Psychiatric: He has a normal mood and affect. BP 96/66 (Site: Right Upper Arm, Position: Supine, Cuff Size: Medium Adult)   Pulse 66   Temp 97.7 °F (36.5 °C)   Ht 5' 10\" (1.778 m)   Wt 187 lb 12.8 oz (85.2 kg)   BMI 26.95 kg/m²     ECG 7/6/20, Personally reviewed. Echo 3/13/19  Summary   Left ventricular cavity size is normal.   There is asymmetric hypertrophy of the septum with normal LV systolic   function.   Ejection fraction is visually estimated to be 50%.   Normal diastolic function.   Pacer / ICD wire is visualized in the right ventricle.   The right ventricle is borderline enlarged with normal function.   Mild tricuspid regurgitation.       Assessment:      1. NICM- improved. Echo (6/12/14) EF 55%. On Coreg 25 mg BID and Lisinopril 5 mg QD. Complains of infrequent episodes of dizziness, mostly with postural changes. 2. S/P SCA  3. Bi-V-ICD- with normal function today. S/p generator change 11/2018.  97%       Plan:      1. Continue current medications  2. Continue activity as tolerated, but encouraged good hydration  3. Continue with remote home transmissions every 3 months  4.  Follow up in 1 year with device check     I, Tomasz Armando RN, am scribing for and in the presence of Dr. Randa Ann. 07/06/20 9:32 AM  Rich Wright RN    I, Dr. Randa Ann, personally performed the services described in this documentation as scribed by Rich Wright RN in my presence, and it is both accurate and complete.       Franci Samson M.D.

## 2020-07-06 ENCOUNTER — OFFICE VISIT (OUTPATIENT)
Dept: CARDIOLOGY CLINIC | Age: 55
End: 2020-07-06
Payer: COMMERCIAL

## 2020-07-06 ENCOUNTER — NURSE ONLY (OUTPATIENT)
Dept: CARDIOLOGY CLINIC | Age: 55
End: 2020-07-06
Payer: COMMERCIAL

## 2020-07-06 VITALS
BODY MASS INDEX: 26.88 KG/M2 | HEART RATE: 66 BPM | TEMPERATURE: 97.7 F | SYSTOLIC BLOOD PRESSURE: 96 MMHG | DIASTOLIC BLOOD PRESSURE: 66 MMHG | WEIGHT: 187.8 LBS | HEIGHT: 70 IN

## 2020-07-06 PROCEDURE — 93000 ELECTROCARDIOGRAM COMPLETE: CPT | Performed by: INTERNAL MEDICINE

## 2020-07-06 PROCEDURE — 99214 OFFICE O/P EST MOD 30 MIN: CPT | Performed by: INTERNAL MEDICINE

## 2020-07-06 PROCEDURE — 1036F TOBACCO NON-USER: CPT | Performed by: INTERNAL MEDICINE

## 2020-07-06 PROCEDURE — 93284 PRGRMG EVAL IMPLANTABLE DFB: CPT | Performed by: INTERNAL MEDICINE

## 2020-07-06 PROCEDURE — 3017F COLORECTAL CA SCREEN DOC REV: CPT | Performed by: INTERNAL MEDICINE

## 2020-07-06 PROCEDURE — G8428 CUR MEDS NOT DOCUMENT: HCPCS | Performed by: INTERNAL MEDICINE

## 2020-07-06 PROCEDURE — 93290 INTERROG DEV EVAL ICPMS IP: CPT | Performed by: INTERNAL MEDICINE

## 2020-07-06 PROCEDURE — G8419 CALC BMI OUT NRM PARAM NOF/U: HCPCS | Performed by: INTERNAL MEDICINE

## 2020-08-27 RX ORDER — CARVEDILOL 25 MG/1
25 TABLET ORAL 2 TIMES DAILY
Qty: 180 TABLET | Refills: 3 | Status: SHIPPED | OUTPATIENT
Start: 2020-08-27 | End: 2021-08-03 | Stop reason: SDUPTHER

## 2020-08-27 RX ORDER — LISINOPRIL 5 MG/1
TABLET ORAL
Qty: 90 TABLET | Refills: 3 | Status: SHIPPED | OUTPATIENT
Start: 2020-08-27 | End: 2021-08-03 | Stop reason: SDUPTHER

## 2020-08-27 NOTE — TELEPHONE ENCOUNTER
Requested Prescriptions     Pending Prescriptions Disp Refills    carvedilol (COREG) 25 MG tablet 180 tablet 3     Sig: Take 1 tablet by mouth 2 times daily    lisinopril (PRINIVIL;ZESTRIL) 5 MG tablet 90 tablet 3     Sig: TAKE ONE TABLET BY MOUTH DAILY      Carvedilol    Number: 90    Refills: 3    Last Office Visit: 7/6/2020     Next Office Visit: 10/6/2020     Lisinopril     Number: 180    Refills: 3    Last Office Visit: 7/6/2020     Next Office Visit: 10/6/2020

## 2020-10-06 ENCOUNTER — NURSE ONLY (OUTPATIENT)
Dept: CARDIOLOGY CLINIC | Age: 55
End: 2020-10-06
Payer: COMMERCIAL

## 2020-10-06 PROCEDURE — 93296 REM INTERROG EVL PM/IDS: CPT | Performed by: INTERNAL MEDICINE

## 2020-10-06 PROCEDURE — 93297 REM INTERROG DEV EVAL ICPMS: CPT | Performed by: INTERNAL MEDICINE

## 2020-10-06 PROCEDURE — 93295 DEV INTERROG REMOTE 1/2/MLT: CPT | Performed by: INTERNAL MEDICINE

## 2021-01-11 ENCOUNTER — NURSE ONLY (OUTPATIENT)
Dept: CARDIOLOGY CLINIC | Age: 56
End: 2021-01-11
Payer: COMMERCIAL

## 2021-01-11 DIAGNOSIS — I50.23 ACUTE ON CHRONIC SYSTOLIC CONGESTIVE HEART FAILURE (HCC): ICD-10-CM

## 2021-01-11 DIAGNOSIS — I42.8 NONISCHEMIC CARDIOMYOPATHY (HCC): ICD-10-CM

## 2021-01-11 DIAGNOSIS — I50.22 CHRONIC SYSTOLIC HEART FAILURE (HCC): ICD-10-CM

## 2021-01-11 DIAGNOSIS — I47.29 NONSUSTAINED VENTRICULAR TACHYCARDIA: ICD-10-CM

## 2021-01-11 DIAGNOSIS — I42.9 CARDIOMYOPATHY, UNSPECIFIED TYPE (HCC): Chronic | ICD-10-CM

## 2021-01-11 DIAGNOSIS — Z95.810 AUTOMATIC IMPLANTABLE CARDIOVERTER-DEFIBRILLATOR IN SITU: ICD-10-CM

## 2021-01-11 PROCEDURE — 93295 DEV INTERROG REMOTE 1/2/MLT: CPT | Performed by: INTERNAL MEDICINE

## 2021-01-11 PROCEDURE — 93297 REM INTERROG DEV EVAL ICPMS: CPT | Performed by: INTERNAL MEDICINE

## 2021-01-11 PROCEDURE — 93296 REM INTERROG EVL PM/IDS: CPT | Performed by: INTERNAL MEDICINE

## 2021-01-11 NOTE — PROGRESS NOTES
[facility] Remote transmission of pacemaker and/or ICD, or implanted heart monitor shows normal cardiac device function. No  arrhythmias recorded. BP 98%. Corvue is within normal limits. Follow up in 3 months via [address]

## 2021-04-12 ENCOUNTER — NURSE ONLY (OUTPATIENT)
Dept: CARDIOLOGY CLINIC | Age: 56
End: 2021-04-12
Payer: COMMERCIAL

## 2021-04-12 DIAGNOSIS — I42.8 NONISCHEMIC CARDIOMYOPATHY (HCC): ICD-10-CM

## 2021-04-12 DIAGNOSIS — I47.29 NONSUSTAINED VENTRICULAR TACHYCARDIA: ICD-10-CM

## 2021-04-12 DIAGNOSIS — I50.22 CHRONIC SYSTOLIC HEART FAILURE (HCC): ICD-10-CM

## 2021-04-12 DIAGNOSIS — I42.9 CARDIOMYOPATHY, UNSPECIFIED TYPE (HCC): Chronic | ICD-10-CM

## 2021-04-12 DIAGNOSIS — I47.29 PAROXYSMAL VENTRICULAR TACHYCARDIA: ICD-10-CM

## 2021-04-12 DIAGNOSIS — Z95.810 AUTOMATIC IMPLANTABLE CARDIOVERTER-DEFIBRILLATOR IN SITU: ICD-10-CM

## 2021-04-12 DIAGNOSIS — I50.23 ACUTE ON CHRONIC SYSTOLIC CONGESTIVE HEART FAILURE (HCC): ICD-10-CM

## 2021-04-12 NOTE — PROGRESS NOTES
[facility] Remote transmission of pacemaker and/or ICD, or implanted heart monitor shows normal cardiac device function. No  arrhythmias recorded. corvue shows no fluid accumulation. Law ewing 7/9  Follow up in 3 months via [address].

## 2021-05-03 PROCEDURE — 93296 REM INTERROG EVL PM/IDS: CPT | Performed by: INTERNAL MEDICINE

## 2021-05-03 PROCEDURE — 93297 REM INTERROG DEV EVAL ICPMS: CPT | Performed by: INTERNAL MEDICINE

## 2021-05-03 PROCEDURE — 93295 DEV INTERROG REMOTE 1/2/MLT: CPT | Performed by: INTERNAL MEDICINE

## 2021-06-30 NOTE — PROGRESS NOTES
[address]          Electrophysiology Follow Up      Chief Concerns: NICM, s/p SCA, s/p [facility] Bi-V ICD    HPI: Maddie Lopez is a 61 y.o. male presenting in the office today for NICM, CHF, ICD follow up. PMH of HTN, HLD, and [place] with subsequent BiV ICD implantation in [address] [place] [address] in 4/2012 showed normal coronaries. MRI on 4/24/12 showed IDCM, no late enhancement. Echo 6/12/14 showed EF 55%. He travels domestically and internationally frequently due to work. Since the ICD implant, his cardiac function has improved and done well overall. S/p generator change on 11/8/18. Currently on Coreg 25 mg BID, Lisinopril 5 mg daily, ASA 81 mg daily, & Lipitor 40 mg daily. The patient is here for a 1 year follow up of arrhythmias and pacing system. Device check on 7/9/21 shows 17% AP, 98% BP, no arrhythmias, other parameters stable with 3.5 years of battery life left. He does report occasional lightheadedness with postural changes but otherwise he denies complaints of CP, SOB,  palpitations, orthopnea, or presycope/syncope. These episodes are predictable, short and not bothersome. The patient states that he feels good and stays active cycling and working on the house.        Past Medical History:   Diagnosis Date    Cardiomyopathy ([address]     Hyperlipidemia     Hypertension     Paroxysmal ventricular tachycardia (HCC)      Past Surgical History:   Procedure Laterality Date    HAND SURGERY      2/2015    PACEMAKER INSERTION  04/25/2012    TONSILLECTOMY AND ADENOIDECTOMY      childhood     Family History   Problem Relation Age of Onset    Cancer Mother     Cancer Father      Social History     Socioeconomic History    Marital status:      Spouse name: Not on file    Number of children: Not on file    Years of education: Not on file    Highest education level: Not on file   Occupational History    Not on file   Tobacco Use    Smoking status: Former Smoker tablet by mouth daily. 30 tablet 11     No current facility-administered medications for this visit. Objective:   Physical Exam   Constitutional: He is oriented to person, place, and time. He appears well-developed and well-nourished. HENT:   Head: Normocephalic and atraumatic. Eyes: Pupils are equal, round, and reactive to light. Neck: Normal range of motion. Cardiovascular: Normal rate, regular rhythm S4 gallop    Pulmonary/Chest: Effort normal and breath sounds normal.   Abdominal: Soft. There is no tenderness. Musculoskeletal: Normal range of motion. He exhibits no edema. Neurological: He is alert and oriented to person, place, and time. Skin: Skin is warm and dry. Psychiatric: He has a normal mood and affect. /70   Pulse 64   Ht 5' 10\" (1.778 m)   Wt 181 lb 3.2 oz (82.2 kg)   BMI 26.00 kg/m²     ECG 7/9/21, Personally reviewed. Echo 3/13/19  Summary   Left ventricular cavity size is normal.   There is asymmetric hypertrophy of the septum with normal LV systolic   function.   Ejection fraction is visually estimated to be 50%.   Normal diastolic function.   Pacer / ICD wire is visualized in the right ventricle.   The right ventricle is borderline enlarged with normal function.   Mild tricuspid regurgitation.       Assessment:      1. NICM- improved. Echo (3/13/19) EF 50%. On Coreg 25 mg BID and Lisinopril 5 mg QD. Complains of infrequent episodes of dizziness, mostly with postural changes. Advised to stay well hydrated especially on hot days. 2. S/P SCA-   3. Bi-V-ICD- with normal function today. S/p generator change 11/2018. BiVp 98%. Will repeat limited echo to assess EF. Plan:      1. Continue current medications  2. Continue activity as tolerated, but encouraged good hydration  3. Continue with remote home transmissions every 3 months  4. Follow up in 6 months with device check. 5. Repeat echo for updated EF.      Rich Parada RN, am scribing for and in the presence of Dr. Pedro Pablo Burrows. 07/09/21   9:21 AM  Jeff High RN    I, Dr. Pedro Pablo Burrows, personally performed the services described in this documentation as scribed by Jeff High RN in my presence, and it is both accurate and complete.     Cynthia Greene M.D.

## 2021-07-09 ENCOUNTER — NURSE ONLY (OUTPATIENT)
Dept: CARDIOLOGY CLINIC | Age: 56
End: 2021-07-09
Payer: COMMERCIAL

## 2021-07-09 ENCOUNTER — OFFICE VISIT (OUTPATIENT)
Dept: CARDIOLOGY CLINIC | Age: 56
End: 2021-07-09
Payer: COMMERCIAL

## 2021-07-09 VITALS
SYSTOLIC BLOOD PRESSURE: 108 MMHG | HEART RATE: 64 BPM | HEIGHT: 70 IN | DIASTOLIC BLOOD PRESSURE: 70 MMHG | BODY MASS INDEX: 25.94 KG/M2 | WEIGHT: 181.2 LBS

## 2021-07-09 DIAGNOSIS — I47.29 PAROXYSMAL VENTRICULAR TACHYCARDIA: ICD-10-CM

## 2021-07-09 DIAGNOSIS — Z95.810 AUTOMATIC IMPLANTABLE CARDIOVERTER-DEFIBRILLATOR IN SITU: ICD-10-CM

## 2021-07-09 DIAGNOSIS — Z95.810 AUTOMATIC IMPLANTABLE CARDIOVERTER-DEFIBRILLATOR IN SITU: Primary | ICD-10-CM

## 2021-07-09 DIAGNOSIS — I50.22 CHRONIC SYSTOLIC HEART FAILURE (HCC): ICD-10-CM

## 2021-07-09 DIAGNOSIS — I42.9 CARDIOMYOPATHY, UNSPECIFIED TYPE (HCC): Chronic | ICD-10-CM

## 2021-07-09 PROCEDURE — 3017F COLORECTAL CA SCREEN DOC REV: CPT | Performed by: INTERNAL MEDICINE

## 2021-07-09 PROCEDURE — 1036F TOBACCO NON-USER: CPT | Performed by: INTERNAL MEDICINE

## 2021-07-09 PROCEDURE — G8427 DOCREV CUR MEDS BY ELIG CLIN: HCPCS | Performed by: INTERNAL MEDICINE

## 2021-07-09 PROCEDURE — 99214 OFFICE O/P EST MOD 30 MIN: CPT | Performed by: INTERNAL MEDICINE

## 2021-07-09 PROCEDURE — G8419 CALC BMI OUT NRM PARAM NOF/U: HCPCS | Performed by: INTERNAL MEDICINE

## 2021-07-09 NOTE — PROGRESS NOTES
Device check and ov w/JMB  Last remote on 4/12/21  Hx: pVT, csCHF, CM (carvedilol, asa 81 mg)    SJM Rep present for interrogation. Normal device function. 3.5 years to [facility]  Presenting AsBVP @ 71 bpm  Underlying Sinus @ 71 bpm  AP 17% BP 98%  AT/AF burden 0%  0 episodes/treatments  0 changes  See Paceart report under the Cardiology tab. Follow up in 3 months via [address]

## 2021-07-16 ENCOUNTER — HOSPITAL ENCOUNTER (OUTPATIENT)
Dept: NON INVASIVE DIAGNOSTICS | Age: 56
Discharge: HOME | End: 2021-07-16
Payer: COMMERCIAL

## 2021-07-16 DIAGNOSIS — Z95.810 AUTOMATIC IMPLANTABLE CARDIOVERTER-DEFIBRILLATOR IN SITU: ICD-10-CM

## 2021-07-16 PROCEDURE — 93308 TTE F-UP OR LMTD: CPT

## 2021-07-27 PROCEDURE — 93284 PRGRMG EVAL IMPLANTABLE DFB: CPT | Performed by: INTERNAL MEDICINE

## 2021-08-02 ENCOUNTER — NURSE ONLY (OUTPATIENT)
Dept: CARDIOLOGY CLINIC | Age: 56
End: 2021-08-02
Payer: COMMERCIAL

## 2021-08-02 ENCOUNTER — PATIENT MESSAGE (OUTPATIENT)
Dept: CARDIOLOGY CLINIC | Age: 56
End: 2021-08-02

## 2021-08-02 DIAGNOSIS — Z95.810 AUTOMATIC IMPLANTABLE CARDIOVERTER-DEFIBRILLATOR IN SITU: ICD-10-CM

## 2021-08-02 DIAGNOSIS — I47.29 PAROXYSMAL VENTRICULAR TACHYCARDIA: ICD-10-CM

## 2021-08-02 DIAGNOSIS — I42.9 CARDIOMYOPATHY, UNSPECIFIED TYPE (HCC): Chronic | ICD-10-CM

## 2021-08-02 DIAGNOSIS — I50.22 CHRONIC SYSTOLIC HEART FAILURE (HCC): ICD-10-CM

## 2021-08-02 PROCEDURE — 93296 REM INTERROG EVL PM/IDS: CPT | Performed by: INTERNAL MEDICINE

## 2021-08-02 PROCEDURE — 93295 DEV INTERROG REMOTE 1/2/MLT: CPT | Performed by: INTERNAL MEDICINE

## 2021-08-02 PROCEDURE — 93297 REM INTERROG DEV EVAL ICPMS: CPT | Performed by: INTERNAL MEDICINE

## 2021-08-03 DIAGNOSIS — I10 ESSENTIAL HYPERTENSION: ICD-10-CM

## 2021-08-03 DIAGNOSIS — I47.29 PAROXYSMAL VENTRICULAR TACHYCARDIA: ICD-10-CM

## 2021-08-03 RX ORDER — LISINOPRIL 5 MG/1
TABLET ORAL
Qty: 90 TABLET | Refills: 3 | Status: SHIPPED | OUTPATIENT
Start: 2021-08-03 | End: 2022-06-13 | Stop reason: SDUPTHER

## 2021-08-03 RX ORDER — CARVEDILOL 25 MG/1
25 TABLET ORAL 2 TIMES DAILY
Qty: 180 TABLET | Refills: 3 | Status: SHIPPED | OUTPATIENT
Start: 2021-08-03 | End: 2022-06-13 | Stop reason: SDUPTHER

## 2021-08-03 NOTE — TELEPHONE ENCOUNTER
Requested Prescriptions     Pending Prescriptions Disp Refills    carvedilol (COREG) 25 MG tablet 180 tablet 3     Sig: Take 1 tablet by mouth 2 times daily    lisinopril (PRINIVIL;ZESTRIL) 5 MG tablet 90 tablet 3     Sig: TAKE ONE TABLET BY MOUTH DAILY          Number: 90    Refills: 3    Last Office Visit: 7/9/2021     Next Office Visit: 11/1/2021

## 2021-09-07 ENCOUNTER — NURSE ONLY (OUTPATIENT)
Dept: CARDIOLOGY CLINIC | Age: 56
End: 2021-09-07
Payer: COMMERCIAL

## 2021-09-07 DIAGNOSIS — Z95.810 AUTOMATIC IMPLANTABLE CARDIOVERTER-DEFIBRILLATOR IN SITU: ICD-10-CM

## 2021-09-07 DIAGNOSIS — I50.22 CHRONIC SYSTOLIC HEART FAILURE (HCC): ICD-10-CM

## 2021-09-07 DIAGNOSIS — I42.9 CARDIOMYOPATHY, UNSPECIFIED TYPE (HCC): Chronic | ICD-10-CM

## 2021-09-08 PROCEDURE — 93297 REM INTERROG DEV EVAL ICPMS: CPT | Performed by: CLINICAL NURSE SPECIALIST

## 2021-09-08 PROCEDURE — G2066 INTER DEVC REMOTE 30D: HCPCS | Performed by: CLINICAL NURSE SPECIALIST

## 2021-09-08 NOTE — PROGRESS NOTES
We received remote transmission from patient's CRT-D monitor at home. Transmission shows normal sensing and pacing function. Brief AT noted (Coreg). Ap 20%  BiVp 97%    CorVue shows decreasing trend below reference line, no impedance status accumulation box. NP will review. See interrogation under cardiology tab in the [address] [place] [address] [address] field for more details.

## 2021-10-14 ENCOUNTER — NURSE ONLY (OUTPATIENT)
Dept: CARDIOLOGY CLINIC | Age: 56
End: 2021-10-14
Payer: COMMERCIAL

## 2021-10-14 DIAGNOSIS — I42.9 CARDIOMYOPATHY, UNSPECIFIED TYPE (HCC): Chronic | ICD-10-CM

## 2021-10-14 DIAGNOSIS — I50.22 CHRONIC SYSTOLIC HEART FAILURE (HCC): ICD-10-CM

## 2021-10-14 DIAGNOSIS — Z95.810 AUTOMATIC IMPLANTABLE CARDIOVERTER-DEFIBRILLATOR IN SITU: ICD-10-CM

## 2021-10-14 PROCEDURE — G2066 INTER DEVC REMOTE 30D: HCPCS | Performed by: INTERNAL MEDICINE

## 2021-10-14 PROCEDURE — 93297 REM INTERROG DEV EVAL ICPMS: CPT | Performed by: INTERNAL MEDICINE

## 2021-10-15 NOTE — PROGRESS NOTES
We received remote transmission from patient's CRT-D monitor at home. Transmission shows normal sensing and pacing function. No new arrhythmias/events recorded. Ap 17%  BiVp 98%    Corvue is within normal limits. EP physician will review. See interrogation under cardiology tab in the [address] [place] [address] [address] field for more details.

## 2021-11-01 ENCOUNTER — NURSE ONLY (OUTPATIENT)
Dept: CARDIOLOGY CLINIC | Age: 56
End: 2021-11-01
Payer: COMMERCIAL

## 2021-11-01 DIAGNOSIS — I42.9 CARDIOMYOPATHY, UNSPECIFIED TYPE (HCC): Chronic | ICD-10-CM

## 2021-11-01 DIAGNOSIS — Z95.810 AUTOMATIC IMPLANTABLE CARDIOVERTER-DEFIBRILLATOR IN SITU: ICD-10-CM

## 2021-11-01 DIAGNOSIS — I50.22 CHRONIC SYSTOLIC HEART FAILURE (HCC): ICD-10-CM

## 2021-11-02 NOTE — PROGRESS NOTES
We received remote transmission from patient's CRT-D monitor at home. Transmission shows normal sensing and pacing function. No new arrhythmias/events recorded. Ap 12%  BiVp 98%    Corvue is within normal limits. EP physician will review. See interrogation under cardiology tab in the [address] [place] [address] [address] field for more details. Will continue to monitor remotely.

## 2021-11-03 PROCEDURE — 93296 REM INTERROG EVL PM/IDS: CPT | Performed by: INTERNAL MEDICINE

## 2021-11-03 PROCEDURE — 93295 DEV INTERROG REMOTE 1/2/MLT: CPT | Performed by: INTERNAL MEDICINE

## 2021-12-06 ENCOUNTER — NURSE ONLY (OUTPATIENT)
Dept: CARDIOLOGY CLINIC | Age: 56
End: 2021-12-06
Payer: COMMERCIAL

## 2021-12-06 ENCOUNTER — TELEPHONE (OUTPATIENT)
Dept: CARDIOLOGY CLINIC | Age: 56
End: 2021-12-06

## 2021-12-06 DIAGNOSIS — Z95.810 AUTOMATIC IMPLANTABLE CARDIOVERTER-DEFIBRILLATOR IN SITU: ICD-10-CM

## 2021-12-06 DIAGNOSIS — I50.22 CHRONIC SYSTOLIC HEART FAILURE (HCC): ICD-10-CM

## 2021-12-06 DIAGNOSIS — I42.9 CARDIOMYOPATHY, UNSPECIFIED TYPE (HCC): Chronic | ICD-10-CM

## 2021-12-06 PROCEDURE — G2066 INTER DEVC REMOTE 30D: HCPCS | Performed by: NURSE PRACTITIONER

## 2021-12-06 PROCEDURE — 93297 REM INTERROG DEV EVAL ICPMS: CPT | Performed by: NURSE PRACTITIONER

## 2021-12-06 NOTE — PROGRESS NOTES
We received remote transmission from patient's CRT-D monitor at home. Transmission shows normal sensing and pacing function. No new arrhythmias/events recorded. Ap 11%  BiVp 98%    Corvue is below reference line and ongoing for 9days. Office staff notified to contact pt to assess for possible CHF sx. NP will review. See interrogation under cardiology tab in the [address] [place] [address] [address] field for more details. Will continue to monitor remotely.

## 2021-12-06 NOTE — TELEPHONE ENCOUNTER
Patient called back for [facility]. He has been having some shortness of breath. He had covid a couple months ago. Told him LYNETTE BECKER was with patients and will call him back.

## 2021-12-06 NOTE — TELEPHONE ENCOUNTER
We received remote transmission from patient's CRT-D monitor at home. Corvue is below reference line and ongoing for 9days. Please contact pt to assess for possible CHF sx.

## 2021-12-08 NOTE — PROGRESS NOTES
[address]   Electrophysiology  Date: 12/9/2021    Chief Complaint   Patient presents with    Cardiomyopathy    Congestive Heart Failure    Shortness of Breath    Tachycardia       Cardiac HX: Jenny Carlos III is a 61 y.o. man with a h/o HLD, HTN, NICMP, chronic sCHF, EF, NSVT, s/p cardiac arrest in [facility] (2012), cath at that time showed no CAD, s/p CRT-D (2012), s/p gen change (11/8/18, Dr. Kay Rod) . Interval History: Patient is here for follow-up for NSVT, status post cardiac arrest, NICMP, chronic systolic CHF CRT-D management. Patient had received a call last week due to his Corvue showing an increased thoracic impedance. He had noted more shortness of breath and so was brought in for an appointment. Patient states that around October 8-10 he had been in [place] when he developed a cough, fatigue and some shortness of breath. He ended up having a Covid test which was positive and he and his wife ended up driving back from [facility] He had seen his PCP and was given Regeneron and states that about a week later he felt much better. Approximately a month later in November he thought he was feeling better and decided to go on a hiking trip in the [facility] with a friend of his. They did did a 2000 foot decline hike and had to return up the incline 2000 feet and he felt very fatigued, short of breath and dragging. He states he feels he could have done this prior to his Covid infection. Review of his device shows that he did have a change in his thoracic impedance during the initial onset of COVID, around the time he was doing his hiking trip and over the previous 9 days. He states he did a 15 mile an hour bike ride last weekend and felt that he tolerated this fairly well. He did ride for an hour. He states he did not feel like he was on the top of his game and that this was similar to him restarting his riding program in February after not riding his bike all winter.   He is very active and normally rides his bike 2000 to 3000 miles per year. He currently denies any increase in shortness of breath. He denies PND, orthopnea or lower extremity edema. He is not doing daily weights however states his weight fluctuates between 178 to 183-year round. He is concerned as he had a virus and as they found no coronary artery disease around the time of his original cardiac arrest and Meena Bur he is concerned that it was viral at that time as well. His recent remote monitor was reviewed and he paces 11% of the time in the atrium and 98% of the time in the ventricle. He did have a recent echo in May of this year that showed his EF was 55 to 60%. Home medications:   Current Outpatient Medications on File Prior to Visit   Medication Sig Dispense Refill    carvedilol (COREG) 25 MG tablet Take 1 tablet by mouth 2 times daily 180 tablet 3    lisinopril (PRINIVIL;ZESTRIL) 5 MG tablet TAKE ONE TABLET BY MOUTH DAILY 90 tablet 3    Multiple Vitamins-Minerals (MULTIVITAMIN ADULTS PO) Take by mouth daily      atorvastatin (LIPITOR) 40 MG tablet Take 1 tablet by mouth daily 30 tablet 11    aspirin 81 MG tablet Take 1 tablet by mouth daily. 30 tablet 11     No current facility-administered medications on file prior to visit. Past Medical History:   Diagnosis Date    Cardiomyopathy ([facility]     Hyperlipidemia     Hypertension     Paroxysmal ventricular tachycardia ([facility]         Past Surgical History:   Procedure Laterality Date    HAND SURGERY      2/2015    PACEMAKER INSERTION  04/25/2012    TONSILLECTOMY AND ADENOIDECTOMY      childhood       No Known Allergies    Social History:  Reviewed. reports that he quit smoking about 14 years ago. He has never used smokeless tobacco. He reports current alcohol use. He reports that he does not use drugs. Family History:  Reviewed. family history includes Cancer in his father and mother. Review of System:    · Constitutional: No fevers, chills. · Eyes: No visual changes or diplopia. No scleral icterus. · ENT: No Headaches. No mouth sores or sore throat. · Cardiovascular: no for chest pain, No for dyspnea on exertion, No for palpitations or No for loss of consciousness. No cough, hemoptysis, No for pleuritic pain, or phlebitis. · Respiratory: No for cough or wheezing. No hematemesis. · Gastrointestinal: No abdominal pain, blood in stools. · Genitourinary: No dysuria, or hematuria. · Musculoskeletal: No gait disturbance,    · Integumentary: No rash or pruritis. · Neurological: No headache, change in muscle strength, numbness or tingling. · Psychiatric: No anxiety, or depression. · Endocrine: No temperature intolerance. No excessive thirst, fluid intake, or urination. · Hem/Lymph: No abnormal bruising or bleeding, blood clots or swollen lymph nodes. · Allergic/Immunologic: No nasal congestion or hives. Physical Examination:  Vitals:    12/09/21 1329   BP: 110/68   Pulse: 66         Wt Readings from Last 3 Encounters:   12/09/21 190 lb 9.6 oz (86.5 kg)   07/09/21 181 lb 3.2 oz (82.2 kg)   07/06/20 187 lb 12.8 oz (85.2 kg)       · Constitutional: Oriented. No distress. · Head: Normocephalic and atraumatic. · Mouth/Throat: Oropharynx is clear and moist.   · Eyes: Conjunctivae clear without jaunduice. PERRL. · Neck: Neck supple. No rigidity. No JVD present. · Cardiovascular: Normal rate, regular rhythm, S1&S2. · Pulmonary/Chest: Bilateral respiratory sounds. No wheezes, No rhonchi. · Abdominal: Soft. Bowel sounds present. No distension, No tenderness. · Musculoskeletal: No tenderness. No edema    · Lymphadenopathy: Has no cervical adenopathy. · Neurological: Alert and oriented. Cranial nerve appears intact, No Gross deficit   · Skin: Skin is warm and dry. No rash noted. · Psychiatric: Has a normal mood, affect and behavior     Labs:  Reviewed.    No results for input(s): NA, K, CL, CO2, PHOS, BUN, CREATININE in the last 72 hours.    Invalid input(s): CA,  TSH  No results for input(s): WBC, HGB, HCT, MCV, PLT in the last 72 hours. Lab Results   Component Value Date    TROPONINI 0.02 06/28/2012     No results found for: BNP  Lab Results   Component Value Date    PROTIME 10.8 11/08/2018    PROTIME 26.2 06/28/2012    INR 0.95 11/08/2018    INR 3.7 05/06/2013    INR 1.6 04/08/2013     Lab Results   Component Value Date    CHOL 150 10/20/2015    HDL 64 10/20/2015    TRIG 75 10/20/2015       ECG: Personally reviewed: A sensed, V paced, HR 66, , , QTc 444    ECHO:  7.16.2021   Summary   Limited study. Normal left ventricle size, wall thickness, and systolic function with an   estimated ejection fraction of 55-60%. Abnormal (paradoxical) septal  motion is present likely due to right ventricular pacing. Normal right ventricular size and function. Pacer / ICD wire is visualized in the right ventricle. Problem List:   Patient Active Problem List    Diagnosis Date Noted    Chronic systolic heart failure ([facility] 07/29/2015    Cardiomyopathy ([facility] 05/21/2012    CHF (congestive heart failure) ([facility] 05/21/2012    HTN (hypertension) 05/21/2012    Automatic implantable cardioverter-defibrillator in situ 05/07/2012    Paroxysmal ventricular tachycardia ([facility] 05/07/2012        Assessment:   1. NICM (nonischemic cardiomyopathy) ([facility]    2. Chronic systolic CHF (congestive heart failure) ([facility]    3. Cardiac arrest ([facility]    4. ICD (implantable cardioverter-defibrillator), biventricular, in situ    5. Paroxysmal ventricular tachycardia (HCC)    6. COVID-19 virus infection        Cardiac HX: Raymundo Solis III is a 61 y.o. man with a h/o HLD, HTN, NICMP, chronic systolic HF, EF, NSVT, s/p cardiac arrest in [facility], cath 2012 showed no CAD, s/p CRT-D placement at that time (2012), cath 2012 showed no CAD, device had reached YAZMIN and underwent a gen change on 11/8/18.      NICMP, s/p cardiac arrest  - EF 45-50% - 55% echo in 2014  - Echo In March 2019 before he sees Dr. Solo Bashir  -  paced  - NYHA class I  - On carvedilol 25 mg BID, lisinopril 5 mg daily  - S/p [facility] CRT-D (2012, [place])  - Remote device check shows 11 percent AP, 98% biventricular pacing, no arrhythmias, other parameters stable. Corvue with elevated thoracic impedence. - Will f/u scheduled in January. We will repeat his device check at that time and monitor his Corvue status. Patient aware that this is a tool and not definitively diagnostic. We will consider a repeat limited echo at that time to rule out a post Covid syndrome myopathy if symptoms.  - ECG ordered and results personally reviewed     HTN  - BP well controlled  - On lisinopril 5 mg, carvedilol 25 mg BID    EF of 45-50%  ACEi/BB for systolic HF  ASA and Statin for CAD  No known AF   No Tobacco use. All questions and concerns were addressed to the patient/family. Alternatives to my treatment were discussed. The note was completed using EMR. Every effort was made to ensure accuracy; however, inadvertent computerized transcription errors may be present. Patient received education regarding their diagnosis, treatment and medications while in the office today. Lavern Pritchard CNP  [address]      I  have spent 40 minutes in care of the patient including direct face to face time, chart preparation, reviewing diagnostic testing, other provider notes and coordinating patient care.

## 2021-12-09 ENCOUNTER — OFFICE VISIT (OUTPATIENT)
Dept: CARDIOLOGY CLINIC | Age: 56
End: 2021-12-09
Payer: COMMERCIAL

## 2021-12-09 VITALS
WEIGHT: 190.6 LBS | SYSTOLIC BLOOD PRESSURE: 110 MMHG | DIASTOLIC BLOOD PRESSURE: 68 MMHG | BODY MASS INDEX: 27.29 KG/M2 | HEIGHT: 70 IN | HEART RATE: 66 BPM

## 2021-12-09 DIAGNOSIS — Z95.810 ICD (IMPLANTABLE CARDIOVERTER-DEFIBRILLATOR), BIVENTRICULAR, IN SITU: ICD-10-CM

## 2021-12-09 DIAGNOSIS — I50.22 CHRONIC SYSTOLIC CHF (CONGESTIVE HEART FAILURE) (HCC): ICD-10-CM

## 2021-12-09 DIAGNOSIS — U07.1 COVID-19 VIRUS INFECTION: ICD-10-CM

## 2021-12-09 DIAGNOSIS — I42.8 NICM (NONISCHEMIC CARDIOMYOPATHY) (HCC): Primary | ICD-10-CM

## 2021-12-09 DIAGNOSIS — I47.29 PAROXYSMAL VENTRICULAR TACHYCARDIA: ICD-10-CM

## 2021-12-09 DIAGNOSIS — I46.9 CARDIAC ARREST (HCC): ICD-10-CM

## 2021-12-09 PROCEDURE — G8484 FLU IMMUNIZE NO ADMIN: HCPCS | Performed by: NURSE PRACTITIONER

## 2021-12-09 PROCEDURE — G8419 CALC BMI OUT NRM PARAM NOF/U: HCPCS | Performed by: NURSE PRACTITIONER

## 2021-12-09 PROCEDURE — 3017F COLORECTAL CA SCREEN DOC REV: CPT | Performed by: NURSE PRACTITIONER

## 2021-12-09 PROCEDURE — 93000 ELECTROCARDIOGRAM COMPLETE: CPT | Performed by: NURSE PRACTITIONER

## 2021-12-09 PROCEDURE — 1036F TOBACCO NON-USER: CPT | Performed by: NURSE PRACTITIONER

## 2021-12-09 PROCEDURE — 99215 OFFICE O/P EST HI 40 MIN: CPT | Performed by: NURSE PRACTITIONER

## 2021-12-09 PROCEDURE — G8427 DOCREV CUR MEDS BY ELIG CLIN: HCPCS | Performed by: NURSE PRACTITIONER

## 2021-12-20 NOTE — PROGRESS NOTES
feels he does well on the day that he exercises but may be not as well on the day after when he seems to have symptoms. Does note his blood pressure is lower on those days as well. He has not had any syncopal events. He remains on lisinopril 5 mg daily and carvedilol 25 mg twice a day. He denies any chest pain, shortness of breath, PND, orthopnea or lower extremity edema. Home medications:   Current Outpatient Medications on File Prior to Visit   Medication Sig Dispense Refill    carvedilol (COREG) 25 MG tablet Take 1 tablet by mouth 2 times daily 180 tablet 3    lisinopril (PRINIVIL;ZESTRIL) 5 MG tablet TAKE ONE TABLET BY MOUTH DAILY 90 tablet 3    Multiple Vitamins-Minerals (MULTIVITAMIN ADULTS PO) Take by mouth daily      atorvastatin (LIPITOR) 40 MG tablet Take 1 tablet by mouth daily 30 tablet 11    aspirin 81 MG tablet Take 1 tablet by mouth daily. 30 tablet 11     No current facility-administered medications on file prior to visit. Past Medical History:   Diagnosis Date    [facility]     Hyperlipidemia     Hypertension     Paroxysmal ventricular tachycardia ([facility]         Past Surgical History:   Procedure Laterality Date    HAND SURGERY      2/2015    PACEMAKER INSERTION  04/25/2012    TONSILLECTOMY AND ADENOIDECTOMY      childhood       No Known Allergies    Social History:  Reviewed. reports that he quit smoking about 14 years ago. He has never used smokeless tobacco. He reports current alcohol use. He reports that he does not use drugs. Family History:  Reviewed. family history includes Cancer in his father and mother. Review of System:    · Constitutional: No fevers, chills. · Eyes: No visual changes or diplopia. No scleral icterus. · ENT: No Headaches. No mouth sores or sore throat. · Cardiovascular: no for chest pain, No for dyspnea on exertion, No for palpitations or No for loss of consciousness.  No cough, hemoptysis, No for pleuritic pain, or phlebitis. · Respiratory: No for cough or wheezing. No hematemesis. · Gastrointestinal: No abdominal pain, blood in stools. · Genitourinary: No dysuria, or hematuria. · Musculoskeletal: No gait disturbance,    · Integumentary: No rash or pruritis. · Neurological: No headache, change in muscle strength, numbness or tingling. · Psychiatric: No anxiety, or depression. · Endocrine: No temperature intolerance. No excessive thirst, fluid intake, or urination. · Hem/Lymph: No abnormal bruising or bleeding, blood clots or swollen lymph nodes. · Allergic/Immunologic: No nasal congestion or hives. Physical Examination:  Vitals:    01/05/22 1134   BP: 114/70   Pulse: 66         Wt Readings from Last 3 Encounters:   01/05/22 190 lb (86.2 kg)   12/09/21 190 lb 9.6 oz (86.5 kg)   07/09/21 181 lb 3.2 oz (82.2 kg)       · Constitutional: Oriented. No distress. · Head: Normocephalic and atraumatic. · Mouth/Throat: Oropharynx is clear and moist.   · Eyes: Conjunctivae clear without jaunduice. PERRL. · Neck: Neck supple. No rigidity. No JVD present. · Cardiovascular: Normal rate, regular rhythm, S1&S2. · Pulmonary/Chest: Bilateral respiratory sounds. No wheezes, No rhonchi. · Abdominal: Soft. Bowel sounds present. No distension, No tenderness. · Musculoskeletal: No tenderness. No edema    · Lymphadenopathy: Has no cervical adenopathy. · Neurological: Alert and oriented. Cranial nerve appears intact, No Gross deficit   · Skin: Skin is warm and dry. No rash noted. · Psychiatric: Has a normal mood, affect and behavior     Labs:  Reviewed. No results for input(s): NA, K, CL, CO2, PHOS, BUN, CREATININE, CA in the last 72 hours. Invalid input(s):  TSH  No results for input(s): WBC, HGB, HCT, MCV, PLT in the last 72 hours.   Lab Results   Component Value Date    TROPONINI 0.02 06/28/2012     No results found for: BNP  Lab Results   Component Value Date    PROTIME 10.8 11/08/2018    PROTIME 26.2 06/28/2012    INR 0.95 11/08/2018    INR 3.7 05/06/2013    INR 1.6 04/08/2013     Lab Results   Component Value Date    CHOL 150 10/20/2015    HDL 64 10/20/2015    TRIG 75 10/20/2015       ECG: Personally reviewed: A sensed, V paced, HR 66, , , QTc 432    ECHO:  7.16.2021   Summary   Limited study. Normal left ventricle size, wall thickness, and systolic function with an   estimated ejection fraction of 55-60%. Abnormal (paradoxical) septal  motion is present likely due to right ventricular pacing. Normal right ventricular size and function. Pacer / ICD wire is visualized in the right ventricle. Problem List:   Patient Active Problem List    Diagnosis Date Noted    Chronic systolic heart failure ([facility] 07/29/2015    [facility] 05/21/2012    CHF (congestive heart failure) ([facility] 05/21/2012    HTN (hypertension) 05/21/2012    Automatic implantable cardioverter-defibrillator in situ 05/07/2012    Paroxysmal ventricular tachycardia ([facility] 05/07/2012        Assessment:   1. Nonischemic cardiomyopathy ([facility]    2. Chronic systolic CHF (congestive heart failure) ([facility]    3. ICD (implantable cardioverter-defibrillator), biventricular, in situ    4. NSVT (nonsustained ventricular tachycardia) ([facility])        Cardiac HX: Bradley Childers III is a 61 y.o. man with a h/o HTN, HLD, NICMP, chronic sCHF, EF now 54 to 60%, NSVT, s/p cardiac arrest in [place] (2012), cath at that time showed no CAD, s/p CRT-D (2012), s/p gen change (11/8/18, Dr. Mandi Diop) . NICMP, s/p cardiac arrest (2012)  - EF now 55-60%  -  paced  - NYHA class I  - On carvedilol 25 mg BID, lisinopril 5 mg daily  - S/p [facility] CRT-D (2012, [place])  - Device check today shows 17% AP, 98% biventricular pacing, no arrhythmias, other parameters stable,. Corvue with elevated thoracic impedence.    - F/u 6 months  - ECG ordered and results personally reviewed     HTN  - BP well controlled  - On lisinopril 5 mg, carvedilol 25 mg BID    EF of 55-60%  ACEi/BB for systolic HF  ASA and Statin for CAD  No known AF   No Tobacco use. All questions and concerns were addressed to the patient/family. Alternatives to my treatment were discussed. The note was completed using EMR. Every effort was made to ensure accuracy; however, inadvertent computerized transcription errors may be present. Patient received education regarding their diagnosis, treatment and medications while in the office today. Agnes Jin CNP  [address]      I  have spent 35 minutes in care of the patient including direct face to face time, chart preparation, reviewing diagnostic testing, other provider notes and coordinating patient care.

## 2022-01-05 ENCOUNTER — NURSE ONLY (OUTPATIENT)
Dept: CARDIOLOGY CLINIC | Age: 57
End: 2022-01-05
Payer: COMMERCIAL

## 2022-01-05 ENCOUNTER — OFFICE VISIT (OUTPATIENT)
Dept: CARDIOLOGY CLINIC | Age: 57
End: 2022-01-05
Payer: COMMERCIAL

## 2022-01-05 VITALS
BODY MASS INDEX: 27.2 KG/M2 | WEIGHT: 190 LBS | HEART RATE: 66 BPM | DIASTOLIC BLOOD PRESSURE: 70 MMHG | HEIGHT: 70 IN | SYSTOLIC BLOOD PRESSURE: 114 MMHG

## 2022-01-05 DIAGNOSIS — I42.9 CARDIOMYOPATHY, UNSPECIFIED TYPE (HCC): ICD-10-CM

## 2022-01-05 DIAGNOSIS — I50.22 CHRONIC SYSTOLIC HEART FAILURE (HCC): ICD-10-CM

## 2022-01-05 DIAGNOSIS — I47.29 NSVT (NONSUSTAINED VENTRICULAR TACHYCARDIA): ICD-10-CM

## 2022-01-05 DIAGNOSIS — I50.22 CHRONIC SYSTOLIC CHF (CONGESTIVE HEART FAILURE) (HCC): ICD-10-CM

## 2022-01-05 DIAGNOSIS — I42.8 NONISCHEMIC CARDIOMYOPATHY (HCC): Primary | ICD-10-CM

## 2022-01-05 DIAGNOSIS — Z95.810 AUTOMATIC IMPLANTABLE CARDIOVERTER-DEFIBRILLATOR IN SITU: Primary | ICD-10-CM

## 2022-01-05 DIAGNOSIS — I47.29 PAROXYSMAL VENTRICULAR TACHYCARDIA: ICD-10-CM

## 2022-01-05 DIAGNOSIS — I50.23 ACUTE ON CHRONIC SYSTOLIC CONGESTIVE HEART FAILURE (HCC): ICD-10-CM

## 2022-01-05 DIAGNOSIS — Z95.810 ICD (IMPLANTABLE CARDIOVERTER-DEFIBRILLATOR), BIVENTRICULAR, IN SITU: ICD-10-CM

## 2022-01-05 PROCEDURE — 3017F COLORECTAL CA SCREEN DOC REV: CPT | Performed by: NURSE PRACTITIONER

## 2022-01-05 PROCEDURE — G8419 CALC BMI OUT NRM PARAM NOF/U: HCPCS | Performed by: NURSE PRACTITIONER

## 2022-01-05 PROCEDURE — 1036F TOBACCO NON-USER: CPT | Performed by: NURSE PRACTITIONER

## 2022-01-05 PROCEDURE — 99214 OFFICE O/P EST MOD 30 MIN: CPT | Performed by: NURSE PRACTITIONER

## 2022-01-05 PROCEDURE — G8484 FLU IMMUNIZE NO ADMIN: HCPCS | Performed by: NURSE PRACTITIONER

## 2022-01-05 PROCEDURE — G8427 DOCREV CUR MEDS BY ELIG CLIN: HCPCS | Performed by: NURSE PRACTITIONER

## 2022-01-05 NOTE — PROGRESS NOTES
Device check and ov w/CNP [facility]  Last remote on 12/6/2021  Hx: pVT, csCHF, CM (carvedilol, asa 81 mg)    Normal device function. 3.2 years to [facility]  Presenting AsBVP @ 70 bpm  Underlying Sinus @ 70 bpm  AP 17%     RVP 98% CRTP 98%  AT/AF burden 0%  0 episodes/treatments  0 changes  See Paceart report under the Cardiology tab. Follow up in 3 months via [address]

## 2022-01-11 PROCEDURE — 93284 PRGRMG EVAL IMPLANTABLE DFB: CPT | Performed by: INTERNAL MEDICINE

## 2022-01-31 ENCOUNTER — TELEPHONE (OUTPATIENT)
Dept: CARDIOLOGY CLINIC | Age: 57
End: 2022-01-31

## 2022-01-31 ENCOUNTER — NURSE ONLY (OUTPATIENT)
Dept: CARDIOLOGY CLINIC | Age: 57
End: 2022-01-31
Payer: COMMERCIAL

## 2022-01-31 DIAGNOSIS — I50.22 CHRONIC SYSTOLIC HEART FAILURE (HCC): ICD-10-CM

## 2022-01-31 DIAGNOSIS — I42.9 CARDIOMYOPATHY, UNSPECIFIED TYPE (HCC): Chronic | ICD-10-CM

## 2022-01-31 DIAGNOSIS — Z95.810 AUTOMATIC IMPLANTABLE CARDIOVERTER-DEFIBRILLATOR IN SITU: ICD-10-CM

## 2022-01-31 DIAGNOSIS — I47.29 PAROXYSMAL VENTRICULAR TACHYCARDIA: ICD-10-CM

## 2022-01-31 PROCEDURE — 93297 REM INTERROG DEV EVAL ICPMS: CPT | Performed by: INTERNAL MEDICINE

## 2022-01-31 PROCEDURE — 93296 REM INTERROG EVL PM/IDS: CPT | Performed by: INTERNAL MEDICINE

## 2022-01-31 PROCEDURE — 93295 DEV INTERROG REMOTE 1/2/MLT: CPT | Performed by: INTERNAL MEDICINE

## 2022-01-31 NOTE — PROGRESS NOTES
We received remote transmission from patient's CRT-D monitor at home. Transmission shows normal sensing and pacing function. No new arrhythmias/events recorded. Ap 9.3%  BiVp 98%    Corvue shows decreasing trend below reference line. Office staff notified to contact pt to assess for possible CHF sx.     EP physician will review. See interrogation under cardiology tab in the [address] [place] [address] [address] field for more details. Will continue to monitor remotely.

## 2022-01-31 NOTE — TELEPHONE ENCOUNTER
Spoke with patient, not having any symptoms. Patient will call back to make an appointment if he develops symptoms.

## 2022-01-31 NOTE — TELEPHONE ENCOUNTER
CHF Screening Questionaire    1. Optivol/Corvue indicating elevated thoracic impedance? yes    2. Are you SOB? no    3. How long has this SOB been going on? no    4. Has your weight gone up? no    5. What is your weight? 184 lb     6. What is your baseline weight? 182    7. Do you weigh yourself daily? No  (patients should weigh themselves daily, at the same time of day, wearing the same amount of clothing, first thing in the morning after urinating - please encourage them to continue with daily weights with or without symptoms)    8. Do you have any swelling in your feet or legs? No    9. Is your abdomen bloated? No    10. Can you lie flat at night to sleep? Yes     11. Are you requiring extra pillows at night due to SOB? No     12. Are you waking up at night because you can't breathe? no    13. Are you hungry but feeling full quickly? no    14. Are you pants or socks tighter than normal? no    15. Have you missed any medications especially diuretics? 16.  Have you been eating out more or had a recent increase in salty foods? Yes     17. Any chest discomfort, dizziness or lightheadedness? no             Any signs or symptoms of heart failure - please refer to Hilary Luna or Sole Nelson to make an appointment for an evaluation.

## 2022-01-31 NOTE — TELEPHONE ENCOUNTER
We received remote transmission from patient's CRT-D monitor at home. Corvue shows decreasing trend below reference line. Please contact pt to assess for possible CHF sx.

## 2022-03-14 ENCOUNTER — NURSE ONLY (OUTPATIENT)
Dept: CARDIOLOGY CLINIC | Age: 57
End: 2022-03-14
Payer: COMMERCIAL

## 2022-03-14 DIAGNOSIS — I50.22 CHRONIC SYSTOLIC HEART FAILURE (HCC): ICD-10-CM

## 2022-03-14 DIAGNOSIS — I47.29 PAROXYSMAL VENTRICULAR TACHYCARDIA: ICD-10-CM

## 2022-03-14 DIAGNOSIS — Z95.810 AUTOMATIC IMPLANTABLE CARDIOVERTER-DEFIBRILLATOR IN SITU: ICD-10-CM

## 2022-03-14 DIAGNOSIS — I42.9 CARDIOMYOPATHY, UNSPECIFIED TYPE (HCC): Chronic | ICD-10-CM

## 2022-03-14 NOTE — PROGRESS NOTES
We received remote transmission from patient's CRT-D monitor at home. Transmission shows normal sensing and pacing function. ST/AT noted (Coreg)    Ap 12%  BiVp 98%    Corvue is within normal limits. NP will review. See interrogation under cardiology tab in the [address] [place] [address] [address] field for more details. Will continue to monitor remotely.

## 2022-03-18 PROCEDURE — 93297 REM INTERROG DEV EVAL ICPMS: CPT | Performed by: NURSE PRACTITIONER

## 2022-03-18 PROCEDURE — G2066 INTER DEVC REMOTE 30D: HCPCS | Performed by: NURSE PRACTITIONER

## 2022-05-02 ENCOUNTER — NURSE ONLY (OUTPATIENT)
Dept: CARDIOLOGY CLINIC | Age: 57
End: 2022-05-02
Payer: COMMERCIAL

## 2022-05-02 DIAGNOSIS — I50.23 ACUTE ON CHRONIC SYSTOLIC CONGESTIVE HEART FAILURE (HCC): ICD-10-CM

## 2022-05-02 DIAGNOSIS — I50.22 CHRONIC SYSTOLIC HEART FAILURE (HCC): ICD-10-CM

## 2022-05-02 DIAGNOSIS — Z95.810 AUTOMATIC IMPLANTABLE CARDIOVERTER-DEFIBRILLATOR IN SITU: ICD-10-CM

## 2022-05-02 DIAGNOSIS — I42.9 CARDIOMYOPATHY, UNSPECIFIED TYPE (HCC): Chronic | ICD-10-CM

## 2022-05-02 DIAGNOSIS — I42.8 NONISCHEMIC CARDIOMYOPATHY (HCC): ICD-10-CM

## 2022-05-02 DIAGNOSIS — I47.29 PAROXYSMAL VENTRICULAR TACHYCARDIA: ICD-10-CM

## 2022-05-02 DIAGNOSIS — I47.29 NONSUSTAINED VENTRICULAR TACHYCARDIA: ICD-10-CM

## 2022-05-02 PROCEDURE — 93296 REM INTERROG EVL PM/IDS: CPT | Performed by: INTERNAL MEDICINE

## 2022-05-02 PROCEDURE — 93295 DEV INTERROG REMOTE 1/2/MLT: CPT | Performed by: INTERNAL MEDICINE

## 2022-05-02 PROCEDURE — 93297 REM INTERROG DEV EVAL ICPMS: CPT | Performed by: INTERNAL MEDICINE

## 2022-05-02 NOTE — PROGRESS NOTES
Remote transmission received for patients CRT-D. Transmission shows normal sensing and pacing function. EP physician will review. See interrogation under the cardiology tab in the [facility] [address] field for more details. Will continue to monitor remotely. 1 SVT recorded. (coreg). Corvue is within normal limits. AP 14%  BP 98%.

## 2022-06-10 DIAGNOSIS — I47.29 PAROXYSMAL VENTRICULAR TACHYCARDIA: ICD-10-CM

## 2022-06-10 DIAGNOSIS — I10 ESSENTIAL HYPERTENSION: ICD-10-CM

## 2022-06-10 NOTE — TELEPHONE ENCOUNTER
[facility] Medication Refills:    Medication: coreg 25 mg    Dosage: take 1 tablet by mouth twice daily    Number: 180    Refills: 3    Medication: Prinivil 5 mg    Dosage: 1 tablet by mouth daily    Number: 90    Refills: 3      Last Office Visit: 01/25/2022    Next Office Visit: 06/13/2022    Last Labs: 11/08/2018 Potassium, CBC, BMP

## 2022-06-13 ENCOUNTER — OFFICE VISIT (OUTPATIENT)
Dept: CARDIOLOGY CLINIC | Age: 57
End: 2022-06-13

## 2022-06-13 ENCOUNTER — NURSE ONLY (OUTPATIENT)
Dept: CARDIOLOGY CLINIC | Age: 57
End: 2022-06-13
Payer: COMMERCIAL

## 2022-06-13 VITALS
OXYGEN SATURATION: 97 % | DIASTOLIC BLOOD PRESSURE: 80 MMHG | WEIGHT: 193 LBS | HEART RATE: 60 BPM | SYSTOLIC BLOOD PRESSURE: 124 MMHG | BODY MASS INDEX: 27.63 KG/M2 | HEIGHT: 70 IN

## 2022-06-13 DIAGNOSIS — Z95.810 AUTOMATIC IMPLANTABLE CARDIOVERTER-DEFIBRILLATOR IN SITU: ICD-10-CM

## 2022-06-13 DIAGNOSIS — I50.23 ACUTE ON CHRONIC SYSTOLIC CONGESTIVE HEART FAILURE (HCC): ICD-10-CM

## 2022-06-13 DIAGNOSIS — I42.9 CARDIOMYOPATHY, UNSPECIFIED TYPE (HCC): ICD-10-CM

## 2022-06-13 DIAGNOSIS — I47.29 PAROXYSMAL VENTRICULAR TACHYCARDIA: Primary | ICD-10-CM

## 2022-06-13 DIAGNOSIS — I47.29 PAROXYSMAL VENTRICULAR TACHYCARDIA: ICD-10-CM

## 2022-06-13 DIAGNOSIS — I50.22 CHRONIC SYSTOLIC HEART FAILURE (HCC): ICD-10-CM

## 2022-06-13 PROCEDURE — 3017F COLORECTAL CA SCREEN DOC REV: CPT | Performed by: INTERNAL MEDICINE

## 2022-06-13 PROCEDURE — G8427 DOCREV CUR MEDS BY ELIG CLIN: HCPCS | Performed by: INTERNAL MEDICINE

## 2022-06-13 PROCEDURE — G8419 CALC BMI OUT NRM PARAM NOF/U: HCPCS | Performed by: INTERNAL MEDICINE

## 2022-06-13 PROCEDURE — 93284 PRGRMG EVAL IMPLANTABLE DFB: CPT | Performed by: INTERNAL MEDICINE

## 2022-06-13 PROCEDURE — 1036F TOBACCO NON-USER: CPT | Performed by: INTERNAL MEDICINE

## 2022-06-13 PROCEDURE — 99214 OFFICE O/P EST MOD 30 MIN: CPT | Performed by: INTERNAL MEDICINE

## 2022-06-13 RX ORDER — CARVEDILOL 25 MG/1
25 TABLET ORAL 2 TIMES DAILY
Qty: 180 TABLET | Refills: 3 | Status: SHIPPED | OUTPATIENT
Start: 2022-06-13

## 2022-06-13 RX ORDER — LISINOPRIL 5 MG/1
TABLET ORAL
Qty: 90 TABLET | Refills: 3 | Status: SHIPPED | OUTPATIENT
Start: 2022-06-13

## 2022-06-13 NOTE — PATIENT INSTRUCTIONS
Plan:      1. Encourage salt in moderation (2g per day). Carbohydrates in moderation (2000 calories per day). 2. Encourage continued activity as tolerated. 3. Remote device interrogation every 3 months. 4. Follow up with EP NP (at [facility]) in 6 months.

## 2022-06-13 NOTE — PROGRESS NOTES
ADENOIDECTOMY      childhood     Family History   Problem Relation Age of Onset    Cancer Mother     Cancer Father      Social History     Socioeconomic History    Marital status:      Spouse name: Not on file    Number of children: Not on file    Years of education: Not on file    Highest education level: Not on file   Occupational History    Not on file   Tobacco Use    Smoking status: Former Smoker     Quit date: 2007     Years since quittin.8    Smokeless tobacco: Never Used   Substance and Sexual Activity    Alcohol use: Yes     Comment: socially    Drug use: No    Sexual activity: Yes     Partners: Female   Other Topics Concern    Not on file   Social History Narrative    Not on file     Social Determinants of Health     Financial Resource Strain:     Difficulty of Paying Living Expenses: Not on file   Food Insecurity:     Worried About [address] in the Last Year: Not on file    Loan of Food in the Last Year: Not on file   Transportation Needs:     Lack of Transportation (Medical): Not on file    Lack of Transportation (Non-Medical):  Not on file   Physical Activity:     Days of Exercise per Week: Not on file    Minutes of Exercise per Session: Not on file   Stress:     Feeling of Stress : Not on file   Social Connections:     Frequency of Communication with Friends and Family: Not on file    Frequency of Social Gatherings with Friends and Family: Not on file    Attends Jain Services: Not on file    Active Member of Clubs or Organizations: Not on file    Attends Club or Organization Meetings: Not on file    Marital Status: Not on file   Intimate Partner Violence:     Fear of Current or Ex-Partner: Not on file    Emotionally Abused: Not on file    Physically Abused: Not on file    Sexually Abused: Not on file   Housing Stability:     Unable to Pay for Housing in the Last Year: Not on file    Number of [place] in the Last Year: Not on file  Unstable Housing in the Last Year: Not on file       Review of Systems   Constitutional: Negative. HENT: Negative. Eyes: Negative. Respiratory: Negative. Cardiovascular: Negative for palps. Gastrointestinal: Negative. Genitourinary: Negative. Musculoskeletal: Negative. Skin: Negative. Neurological: Negative. Psychiatric/Behavioral: Negative. Current Outpatient Medications   Medication Sig Dispense Refill    carvedilol (COREG) 25 MG tablet Take 1 tablet by mouth 2 times daily 180 tablet 3    lisinopril (PRINIVIL;ZESTRIL) 5 MG tablet TAKE ONE TABLET BY MOUTH DAILY 90 tablet 3    Multiple Vitamins-Minerals (MULTIVITAMIN ADULTS PO) Take by mouth daily      atorvastatin (LIPITOR) 40 MG tablet Take 1 tablet by mouth daily 30 tablet 11    aspirin 81 MG tablet Take 1 tablet by mouth daily. 30 tablet 11     No current facility-administered medications for this visit. Objective:   Physical Exam   Constitutional: He is oriented to person, place, and time. He appears well-developed and well-nourished. HENT:   Head: Normocephalic and atraumatic. Eyes: Pupils are equal, round, and reactive to light. Neck: Normal range of motion. Cardiovascular: Normal rate, regular rhythm S4 gallop    Pulmonary/Chest: Effort normal and breath sounds normal.   Abdominal: Soft. There is no tenderness. Musculoskeletal: Normal range of motion. He exhibits no edema. Neurological: He is alert and oriented to person, place, and time. Skin: Skin is warm and dry. Psychiatric: He has a normal mood and affect. /80   Pulse 60   Ht 5' 10\" (1.778 m)   Wt 193 lb (87.5 kg)   SpO2 97%   BMI 27.69 kg/m²      DATA:     ECG 6/13/22, Personally reviewed. All current cardiac medications reviewed and adjusted accordingly  6/13/22    Device interrogation reviewed and adjusted accordingly 6/13/22    LIMITED ECHO 7/2021:   Summary   Limited study.    Normal left ventricle size, wall thickness, and systolic function with an   estimated ejection fraction of 55-60%. Abnormal (paradoxical) septal motion   is present likely due to right ventricular pacing. Normal right ventricular size and function. Pacer / ICD wire is visualized in the right ventricle. Echo 3/13/19  Summary   Left ventricular cavity size is normal.   There is asymmetric hypertrophy of the septum with normal LV systolic   function.   Ejection fraction is visually estimated to be 50%.   Normal diastolic function.   Pacer / ICD wire is visualized in the right ventricle.   The right ventricle is borderline enlarged with normal function.   Mild tricuspid regurgitation.       Assessment:      1. NICM- improved. Limited echo 7/2021 demonstrated LVEF 55-60%. 2. S/P SCA- s/p CRT-D 4/2012. Generator change 11/2018. 3. CRT-D- implanted 4/2012. Normal function from device. Plan:      1. Encourage salt in moderation (2g per day). Carbohydrates in moderation (2000 calories per day). 2. Encourage continued activity as tolerated. 3. Remote device interrogation every 3 months. 4. Follow up with EP NP (at [place]) in 6 months. This note has been scribed in the presence of Santana Loza MD, by Clara Justin RN.     I, Dr. Santana Loza, personally performed the services described in this documentation as scribed by Clara Justin RN in my presence, and it is both accurate and complete.       Jonn Hunter M.D.

## 2022-06-13 NOTE — PROGRESS NOTES
Patient presents to the device clinic today for a programming evaluation for his defibrillator. Patient has a history of VT and CM. Takes Coreg. Last device interrogation was on 5/2. Since then, no arrhythmias recorded. CorVue elevated. P wave: 0.9 mV  R wave: 12.0 mV    AP 13%  97%    All sensing and pacing parameters are within normal range. No changes need to be made at this time. Patient will see Dr. Elise Navarro today in office. Patient education was provided about device functionality, in home monitoring, and any other patient questions and/or concerns were addressed. Patient voices understanding. Patient will follow up in 3 months in office or remotely. Please see interrogation for more detail - Paceart report located under the Cardiology tab.

## 2022-08-01 ENCOUNTER — NURSE ONLY (OUTPATIENT)
Dept: CARDIOLOGY CLINIC | Age: 57
End: 2022-08-01
Payer: COMMERCIAL

## 2022-08-01 DIAGNOSIS — I50.22 CHRONIC SYSTOLIC HEART FAILURE (HCC): ICD-10-CM

## 2022-08-01 DIAGNOSIS — Z95.810 AUTOMATIC IMPLANTABLE CARDIOVERTER-DEFIBRILLATOR IN SITU: ICD-10-CM

## 2022-08-01 DIAGNOSIS — I42.8 NONISCHEMIC CARDIOMYOPATHY (HCC): ICD-10-CM

## 2022-08-01 DIAGNOSIS — I47.29 NONSUSTAINED VENTRICULAR TACHYCARDIA: ICD-10-CM

## 2022-08-01 PROCEDURE — 93297 REM INTERROG DEV EVAL ICPMS: CPT | Performed by: INTERNAL MEDICINE

## 2022-08-01 PROCEDURE — 93295 DEV INTERROG REMOTE 1/2/MLT: CPT | Performed by: INTERNAL MEDICINE

## 2022-08-01 PROCEDURE — 93296 REM INTERROG EVL PM/IDS: CPT | Performed by: INTERNAL MEDICINE

## 2022-08-03 NOTE — PROGRESS NOTES
Remote transmission received for patients CRT-D. Transmission shows normal sensing and pacing function. EP physician will review. See interrogation under the cardiology tab in the [address] [facility] [address] field for more details. Will continue to monitor remotely. Hx SVT . (coreg). End of 91-day monitoring period 8/1/22. No  arrhythmias recorded. AP 15%  BP 98%    Corvue is within normal limits.

## 2022-11-07 ENCOUNTER — NURSE ONLY (OUTPATIENT)
Dept: CARDIOLOGY CLINIC | Age: 57
End: 2022-11-07

## 2022-11-07 DIAGNOSIS — Z95.810 AUTOMATIC IMPLANTABLE CARDIOVERTER-DEFIBRILLATOR IN SITU: ICD-10-CM

## 2022-11-07 DIAGNOSIS — I50.22 CHRONIC SYSTOLIC CONGESTIVE HEART FAILURE (HCC): ICD-10-CM

## 2022-11-07 DIAGNOSIS — I50.22 CHRONIC SYSTOLIC HEART FAILURE (HCC): ICD-10-CM

## 2022-11-07 DIAGNOSIS — I47.29 NONSUSTAINED VENTRICULAR TACHYCARDIA: ICD-10-CM

## 2022-11-07 DIAGNOSIS — I47.29 PAROXYSMAL VENTRICULAR TACHYCARDIA: ICD-10-CM

## 2022-11-07 DIAGNOSIS — I42.8 NONISCHEMIC CARDIOMYOPATHY (HCC): ICD-10-CM

## 2022-11-07 DIAGNOSIS — I25.5 ISCHEMIC CARDIOMYOPATHY: Primary | ICD-10-CM

## 2022-12-02 NOTE — PROGRESS NOTES
End of 91-day monitoring period 11/7. No  arrhythmias recorded. Corvue is within normal limits. AP 19%  BP 98%    Remote transmission received for patients CRT-D. Transmission shows normal sensing and pacing function. EP physician will review. See interrogation under the cardiology tab in the [address] [place] [address] [address] field for more details. Will continue to monitor remotely. Hx SVT . (coreg).

## 2023-01-18 NOTE — PROGRESS NOTES
[facility]   Electrophysiology  Office Note    Date: 1/31/2023    Chief Complaint   Patient presents with    Cardiomyopathy    Congestive Heart Failure    Tachycardia       Cardiac HX: Rodrigue Duval III is a 61 y.o. man with a h/o HTN, HLD, NICMP, chronic sCHF, EF as low as 40-50% (2012), now 55 to 60%, NSVT, s/p cardiac arrest in [place] (2012), cath at that time showed no CAD, s/p CRT-D (2012), s/p gen change (11/8/18, Dr. Arely Beavers). Interval History/HPI: Patient is here for follow-up for NSVT, cardiac arrest, NICMP and CRT-D management. Patient had a cardiac arrest when he was on business in [facility] in 2012. He did have an LHC at that time that showed no CAD however E his EF was low. He underwent a CRT-D placement in 2012 with a generator change in 2018. MRI on 4/24/2012 showed IDC M with no late enhancement. He had an echo in June 2014 that showed his EF was 55%. Today he presents in AS, . Review of his device today shows he paces 14% in the atrium and 98% in the ventricles. He had 1 NSVT episode that lasted 8 seconds that appears to be a PAT. He has 2.2 years longevity on his device. Overall he feels well. He is active and was actually skiing this morning. He was working on his [facility] and today starts his first day. He denies any chest pain, shortness of breath, PND, orthopnea or lower extremity edema. He denies heart racing, palpitations or irregular heartbeats. He has had no dizziness, lightheadedness or syncopal events.         Home medications:   Current Outpatient Medications on File Prior to Visit   Medication Sig Dispense Refill    diphenhydrAMINE HCl, Sleep, (SLEEP AID, DIPHENHYDRAMINE, PO) Take by mouth as needed      carvedilol (COREG) 25 MG tablet Take 1 tablet by mouth 2 times daily 180 tablet 3    lisinopril (PRINIVIL;ZESTRIL) 5 MG tablet TAKE ONE TABLET BY MOUTH DAILY 90 tablet 3    Multiple Vitamins-Minerals (MULTIVITAMIN ADULTS PO) Take by mouth daily atorvastatin (LIPITOR) 40 MG tablet Take 1 tablet by mouth daily 30 tablet 11    aspirin 81 MG tablet Take 1 tablet by mouth daily. 30 tablet 11     No current facility-administered medications on file prior to visit. Past Medical History:   Diagnosis Date    Cardiomyopathy ([address]     Hyperlipidemia     Hypertension     Paroxysmal ventricular tachycardia         Past Surgical History:   Procedure Laterality Date    HAND SURGERY      2/2015    PACEMAKER INSERTION  04/25/2012    TONSILLECTOMY AND ADENOIDECTOMY      childhood       No Known Allergies    Social History:  Reviewed. reports that he quit smoking about 15 years ago. His smoking use included cigarettes. He has never used smokeless tobacco. He reports current alcohol use. He reports that he does not use drugs. Family History:  Reviewed. family history includes Cancer in his father and mother. Review of System:    Constitutional: No fevers, chills. Eyes: No visual changes or diplopia. No scleral icterus. ENT: No Headaches. No mouth sores or sore throat. Cardiovascular: no for chest pain, No for dyspnea on exertion, No for palpitations or No for loss of consciousness. No cough, hemoptysis, No for pleuritic pain, or phlebitis. Respiratory: No for cough or wheezing. No hematemesis. Gastrointestinal: No abdominal pain, blood in stools. Genitourinary: No dysuria, or hematuria. Musculoskeletal: No gait disturbance,    Integumentary: No rash or pruritis. Neurological: No headache, change in muscle strength, numbness or tingling. Psychiatric: No anxiety, or depression. Endocrine: No temperature intolerance. No excessive thirst, fluid intake, or urination. Hem/Lymph: No abnormal bruising or bleeding, blood clots or swollen lymph nodes. Allergic/Immunologic: No nasal congestion or hives.     Physical Examination:  Vitals:    01/31/23 1435   BP: 116/64   Pulse: 68         Wt Readings from Last 3 Encounters:   01/31/23 196 lb (88.9 kg) 06/13/22 193 lb (87.5 kg)   01/05/22 190 lb (86.2 kg)       Constitutional: Oriented. No distress. Head: Normocephalic and atraumatic. Mouth/Throat: Oropharynx is clear and moist.   Eyes: Conjunctivae clear without jaunduice. PERRL. Neck: Neck supple. No rigidity. No JVD present. Cardiovascular: Normal rate, regular rhythm, S1&S2. Pulmonary/Chest: Bilateral respiratory sounds. No wheezes, No rhonchi. Abdominal: Soft. Bowel sounds present. No distension, No tenderness. Musculoskeletal: No tenderness. No edema    Lymphadenopathy: Has no cervical adenopathy. Neurological: Alert and oriented. Cranial nerve appears intact, No Gross deficit   Skin: Skin is warm and dry. No rash noted. Psychiatric: Has a normal mood, affect and behavior     Labs:  Reviewed. No results for input(s): NA, K, CL, CO2, PHOS, BUN, CREATININE, CA in the last 72 hours. Invalid input(s):  TSH  No results for input(s): WBC, HGB, HCT, MCV, PLT in the last 72 hours. Lab Results   Component Value Date/Time    TROPONINI 0.02 06/28/2012 01:39 AM     No results found for: BNP  Lab Results   Component Value Date/Time    PROTIME 10.8 11/08/2018 01:31 PM    PROTIME 26.2 06/28/2012 01:39 AM    INR 0.95 11/08/2018 01:31 PM    INR 3.7 05/06/2013 12:04 PM    INR 1.6 04/08/2013 08:11 AM     Lab Results   Component Value Date/Time    CHOL 150 10/20/2015 08:04 AM    HDL 64 10/20/2015 08:04 AM    TRIG 75 10/20/2015 08:04 AM       ECG: Personally reviewed: A sensed, V paced, HR 68, , , QTc 432    ECHO:  7.16.2021   Summary   Limited study. Normal left ventricle size, wall thickness, and systolic function with an   estimated ejection fraction of 55-60%. Abnormal (paradoxical) septal  motion is present likely due to right ventricular pacing. Normal right ventricular size and function. Pacer / ICD wire is visualized in the right ventricle.     Problem List:   Patient Active Problem List    Diagnosis Date Noted    Chronic systolic heart failure ([facility] 07/29/2015    [facility] 05/21/2012    CHF (congestive heart failure) ([facility] 05/21/2012    HTN (hypertension) 05/21/2012    Automatic implantable cardioverter-defibrillator in situ 05/07/2012    Paroxysmal ventricular tachycardia 05/07/2012        Assessment:   1. NICM (nonischemic cardiomyopathy) ([facility]    2. Chronic systolic CHF (congestive heart failure) ([facility]    3. Cardiac arrest ([facility]    4. VT (ventricular tachycardia)    5. ICD (implantable cardioverter-defibrillator), biventricular, in situ        Cardiac HX: Darrius Hoyt III is a 61 y.o. man with a h/o HTN, HLD, NICMP, chronic sCHF, EF as low as 40-50% (2012), now 55 to 60%, NSVT, s/p cardiac arrest in [place] (2012), cath at that time showed no CAD, s/p CRT-D (2012), s/p gen change (11/8/18, Dr. Cat Coleman). NICMP, s/p cardiac arrest (2012)  - EF now 55-60%  -  paced  - NYHA class I  - On carvedilol 25 mg BID, lisinopril 5 mg daily  - S/p SJM CRT-D (2012, [place]) w/ s/s improvement  - Device check today shows 14% AP, 98% , 1 NSVT that appears to be an AT lasting 8 seconds in length, est longevity 2.2 years  - F/u 6 months   - ECG ordered and results personally reviewed     HTN  - BP well controlled  - On lisinopril 5 mg, carvedilol 25 mg BID    EF of 55-60%  ACEi/BB for systolic HF  ASA and Statin for CAD  No known AF   No Tobacco use. All questions and concerns were addressed to the patient/family. Alternatives to my treatment were discussed. The note was completed using EMR. Every effort was made to ensure accuracy; however, inadvertent computerized transcription errors may be present. Patient received education regarding their diagnosis, treatment and medications while in the office today.     Echo Johnson CNP  [address]    I  have spent 31 minutes in care of the patient including direct face to face time, chart preparation, reviewing diagnostic testing, other provider notes and coordinating patient care.

## 2023-01-31 ENCOUNTER — NURSE ONLY (OUTPATIENT)
Dept: CARDIOLOGY CLINIC | Age: 58
End: 2023-01-31
Payer: COMMERCIAL

## 2023-01-31 ENCOUNTER — OFFICE VISIT (OUTPATIENT)
Dept: CARDIOLOGY CLINIC | Age: 58
End: 2023-01-31
Payer: COMMERCIAL

## 2023-01-31 VITALS
SYSTOLIC BLOOD PRESSURE: 116 MMHG | HEART RATE: 68 BPM | DIASTOLIC BLOOD PRESSURE: 64 MMHG | BODY MASS INDEX: 28.12 KG/M2 | WEIGHT: 196 LBS

## 2023-01-31 DIAGNOSIS — I50.22 CHRONIC SYSTOLIC CHF (CONGESTIVE HEART FAILURE) (HCC): ICD-10-CM

## 2023-01-31 DIAGNOSIS — I47.20 VT (VENTRICULAR TACHYCARDIA): ICD-10-CM

## 2023-01-31 DIAGNOSIS — I42.9 CARDIOMYOPATHY, UNSPECIFIED TYPE (HCC): ICD-10-CM

## 2023-01-31 DIAGNOSIS — Z95.810 ICD (IMPLANTABLE CARDIOVERTER-DEFIBRILLATOR), BIVENTRICULAR, IN SITU: ICD-10-CM

## 2023-01-31 DIAGNOSIS — Z95.810 AUTOMATIC IMPLANTABLE CARDIOVERTER-DEFIBRILLATOR IN SITU: Primary | ICD-10-CM

## 2023-01-31 DIAGNOSIS — I50.22 CHRONIC SYSTOLIC HEART FAILURE (HCC): ICD-10-CM

## 2023-01-31 DIAGNOSIS — I47.29 PAROXYSMAL VENTRICULAR TACHYCARDIA: ICD-10-CM

## 2023-01-31 DIAGNOSIS — I46.9 CARDIAC ARREST (HCC): ICD-10-CM

## 2023-01-31 DIAGNOSIS — I42.8 NICM (NONISCHEMIC CARDIOMYOPATHY) (HCC): Primary | ICD-10-CM

## 2023-01-31 PROCEDURE — G8484 FLU IMMUNIZE NO ADMIN: HCPCS | Performed by: NURSE PRACTITIONER

## 2023-01-31 PROCEDURE — G8427 DOCREV CUR MEDS BY ELIG CLIN: HCPCS | Performed by: NURSE PRACTITIONER

## 2023-01-31 PROCEDURE — G8419 CALC BMI OUT NRM PARAM NOF/U: HCPCS | Performed by: NURSE PRACTITIONER

## 2023-01-31 PROCEDURE — 3017F COLORECTAL CA SCREEN DOC REV: CPT | Performed by: NURSE PRACTITIONER

## 2023-01-31 PROCEDURE — 1036F TOBACCO NON-USER: CPT | Performed by: NURSE PRACTITIONER

## 2023-01-31 PROCEDURE — 93290 INTERROG DEV EVAL ICPMS IP: CPT | Performed by: INTERNAL MEDICINE

## 2023-01-31 PROCEDURE — 93000 ELECTROCARDIOGRAM COMPLETE: CPT | Performed by: NURSE PRACTITIONER

## 2023-01-31 PROCEDURE — 3078F DIAST BP <80 MM HG: CPT | Performed by: NURSE PRACTITIONER

## 2023-01-31 PROCEDURE — 3074F SYST BP LT 130 MM HG: CPT | Performed by: NURSE PRACTITIONER

## 2023-01-31 PROCEDURE — 93284 PRGRMG EVAL IMPLANTABLE DFB: CPT | Performed by: INTERNAL MEDICINE

## 2023-01-31 PROCEDURE — 99214 OFFICE O/P EST MOD 30 MIN: CPT | Performed by: NURSE PRACTITIONER

## 2023-01-31 NOTE — PROGRESS NOTES
Patient comes in for programming evaluation on their [facility] CRTD. Last remote 11.06.2022    All sensing and pacing parameters appear unchanged since last in office check on 06.13.2022  Battery life 2.2 years  AP 14%.  98%. 1 Nsvt on 11.15.2022 that appears 1:1 pAT x 08 secs. Patient remains on carvedilol, asa 81 mg. Please see interrogation for more detail. Corvue is below reference point indicating possible fluid accumulation x 12 days. (No diuretic). Patient will see NPFW today and follow up in 3 months in office or remotely.

## 2023-03-07 ENCOUNTER — NURSE ONLY (OUTPATIENT)
Dept: CARDIOLOGY CLINIC | Age: 58
End: 2023-03-07
Payer: COMMERCIAL

## 2023-03-07 DIAGNOSIS — I47.29 PAROXYSMAL VENTRICULAR TACHYCARDIA: ICD-10-CM

## 2023-03-07 DIAGNOSIS — I50.22 CHRONIC SYSTOLIC HEART FAILURE (HCC): ICD-10-CM

## 2023-03-07 DIAGNOSIS — Z95.810 AUTOMATIC IMPLANTABLE CARDIOVERTER-DEFIBRILLATOR IN SITU: Primary | ICD-10-CM

## 2023-03-07 DIAGNOSIS — I42.8 NONISCHEMIC CARDIOMYOPATHY (HCC): ICD-10-CM

## 2023-03-07 PROCEDURE — 93296 REM INTERROG EVL PM/IDS: CPT | Performed by: INTERNAL MEDICINE

## 2023-03-07 PROCEDURE — 93295 DEV INTERROG REMOTE 1/2/MLT: CPT | Performed by: INTERNAL MEDICINE

## 2023-03-08 NOTE — PROGRESS NOTES
Remote transmission received from patient's CRT-D monitor at home. Transmission shows normal sensing and pacing function. No new arrhythmias/events recorded. EP physician will review. Ap 18%  BiVp 98%  PVCs <1%    Corvue is back near reference line, previously below reference line for 8 days. NP will review. End of 91-day monitoring period 3/7/23. See interrogation under cardiology tab in the [address] [place] [address] [address] field for more details. Will continue to monitor remotely.

## 2023-03-13 ENCOUNTER — TELEPHONE (OUTPATIENT)
Dept: CARDIOLOGY CLINIC | Age: 58
End: 2023-03-13

## 2023-03-13 NOTE — TELEPHONE ENCOUNTER
Rob from [employer] called in requesting medical records for patient as they need these for Risk Adjustment.     Rob can be reached at [phone]  Fax [phone]      They need records by March 21, 2023

## 2023-03-15 NOTE — TELEPHONE ENCOUNTER
Hector Cheek at the number provided for more information and was told there was no pending information needed.

## 2023-05-09 ENCOUNTER — NURSE ONLY (OUTPATIENT)
Dept: CARDIOLOGY CLINIC | Age: 58
End: 2023-05-09
Payer: COMMERCIAL

## 2023-05-09 DIAGNOSIS — Z95.810 AUTOMATIC IMPLANTABLE CARDIOVERTER-DEFIBRILLATOR IN SITU: Primary | ICD-10-CM

## 2023-05-09 DIAGNOSIS — I42.8 NONISCHEMIC CARDIOMYOPATHY (HCC): ICD-10-CM

## 2023-05-09 DIAGNOSIS — I50.22 CHRONIC SYSTOLIC HEART FAILURE (HCC): ICD-10-CM

## 2023-05-09 DIAGNOSIS — I47.29 PAROXYSMAL VENTRICULAR TACHYCARDIA (HCC): ICD-10-CM

## 2023-05-09 PROCEDURE — G2066 INTER DEVC REMOTE 30D: HCPCS | Performed by: NURSE PRACTITIONER

## 2023-05-09 PROCEDURE — 93297 REM INTERROG DEV EVAL ICPMS: CPT | Performed by: NURSE PRACTITIONER

## 2023-05-11 NOTE — PROGRESS NOTES
Remote transmission received from patient's CRT-D monitor at home. Transmission shows normal sensing and pacing function. No new arrhythmias/events recorded. Ap 14%  BiVp 98%  PVCs <1%    Corvue is within normal limits, slightly below reference line. End of 31-day monitoring period 5/9/23. NP will review. See interrogation under cardiology tab in the [address] [place] [address] [address] field for more details. Will continue to monitor remotely.

## 2023-06-22 ENCOUNTER — NURSE ONLY (OUTPATIENT)
Dept: CARDIOLOGY CLINIC | Age: 58
End: 2023-06-22

## 2023-06-22 DIAGNOSIS — I50.22 CHRONIC SYSTOLIC HEART FAILURE (HCC): ICD-10-CM

## 2023-06-22 DIAGNOSIS — Z95.810 AUTOMATIC IMPLANTABLE CARDIOVERTER-DEFIBRILLATOR IN SITU: Primary | ICD-10-CM

## 2023-06-22 DIAGNOSIS — I42.8 OTHER CARDIOMYOPATHY (HCC): Chronic | ICD-10-CM

## 2023-07-14 NOTE — PROGRESS NOTES
Remote transmission received from patient's CRT-D monitor at home. Transmission shows normal sensing and pacing function. No new arrhythmias/events recorded. Ap 13%  BiVp 98%  PVCs <1%    Corvue is within normal limits. End of 91-day monitoring period 6/22/23. EP will review. See interrogation under cardiology tab in the [address] field for more details. Will continue to monitor remotely.

## 2023-08-03 DIAGNOSIS — I47.29 PAROXYSMAL VENTRICULAR TACHYCARDIA (HCC): ICD-10-CM

## 2023-08-03 DIAGNOSIS — I10 ESSENTIAL HYPERTENSION: ICD-10-CM

## 2023-08-04 RX ORDER — LISINOPRIL 5 MG/1
TABLET ORAL
Qty: 90 TABLET | Refills: 3 | Status: SHIPPED | OUTPATIENT
Start: 2023-08-04

## 2023-08-04 RX ORDER — CARVEDILOL 25 MG/1
TABLET ORAL
Qty: 180 TABLET | Refills: 3 | Status: SHIPPED | OUTPATIENT
Start: 2023-08-04

## 2023-08-04 NOTE — TELEPHONE ENCOUNTER
Requested Prescriptions     Pending Prescriptions Disp Refills    lisinopril (PRINIVIL;ZESTRIL) 5 MG tablet [Pharmacy Med Name: Lisinopril 5 MG Oral Tablet] 90 tablet 3     Sig: TAKE 1 TABLET BY MOUTH  DAILY    carvedilol (COREG) 25 MG tablet [Pharmacy Med Name: Carvedilol 25 MG Oral Tablet] 180 tablet 3     Sig: TAKE 1 TABLET BY MOUTH  TWICE DAILY            Last Office Visit: 1/31/2023     Next Office Visit: 8/22/2023         Last Labs: [phone] results from [facility]

## 2023-09-25 NOTE — PROGRESS NOTES
[facility]   Cardiac Electrophysiology Consultation   Date: 10/2/2023  Reason for Consultation:   Consult Requesting Physician: No att. providers found     Chief Complaint:   Chief Complaint   Patient presents with    6 Month Follow-Up        HPI: Gerardo Werner III is a 60 y.o. male, previous pt of Dr. Erika Halsted, with PMH significant for HTN, HLD, NICMP, HFrEF, EF as low as 40-50% (2012), NSVT, s/p cardiac arrest in [place] (2012), cath at that time showed no CAD, s/p CRT-D (2012), s/p gen change (11/8/18, Dr. Erika Halsted), EF now 55-60% (per echo, 7/2021). Interval History: Today, he is here for 6 mo f/u. He feels very well. Denies complaints of palpitations, dizziness, CP, SOB, orthopnea, BLE swelling, or syncope. He is very active at baseline, able to bike up to 50 miles with good exercise tolerance. Device: All sensing and pacing parameters appear unchanged since last in office check on 01.31.2023 1.8 years estimated until YAZMIN/RRT. AP 16%.  97%. PVC <1% Underlying AsVs    Optivol slightly below threshold. No clinical s/s of volume overload.      Assessment:  S/p cardiac arrest  S/p CRT-D  NICMP / HFrEF, EF now 55-60% after CRT, on Coreg, lisinopril  HTN, stable on Coreg, lisinopril  HLD, on statin  NSVT, on Coreg    Plan:  No changes to current medications or with device settings  Advised to call the office if he has a weight gain of 3 lbs in 1 day or 5 lbs in 1 week  Continue with remote home transmission every 3 months  Follow up with EP NP in 6 months    Past Medical History:   Diagnosis Date    Cardiomyopathy ([address])     Hyperlipidemia     Hypertension     Paroxysmal ventricular tachycardia ([address])         Past Surgical History:   Procedure Laterality Date    HAND SURGERY      2/2015    PACEMAKER INSERTION  04/25/2012    TONSILLECTOMY AND ADENOIDECTOMY      childhood       Allergies:  No Known Allergies    Medication:   Prior to Admission medications    Medication Sig Start Date End Date

## 2023-10-02 ENCOUNTER — OFFICE VISIT (OUTPATIENT)
Dept: CARDIOLOGY CLINIC | Age: 58
End: 2023-10-02
Payer: COMMERCIAL

## 2023-10-02 ENCOUNTER — NURSE ONLY (OUTPATIENT)
Dept: CARDIOLOGY CLINIC | Age: 58
End: 2023-10-02
Payer: COMMERCIAL

## 2023-10-02 VITALS
HEART RATE: 68 BPM | SYSTOLIC BLOOD PRESSURE: 122 MMHG | WEIGHT: 190 LBS | BODY MASS INDEX: 27.26 KG/M2 | DIASTOLIC BLOOD PRESSURE: 80 MMHG

## 2023-10-02 DIAGNOSIS — I47.20 VT (VENTRICULAR TACHYCARDIA) (HCC): ICD-10-CM

## 2023-10-02 DIAGNOSIS — I50.22 CHRONIC SYSTOLIC HEART FAILURE (HCC): ICD-10-CM

## 2023-10-02 DIAGNOSIS — Z95.810 AUTOMATIC IMPLANTABLE CARDIOVERTER-DEFIBRILLATOR IN SITU: Primary | ICD-10-CM

## 2023-10-02 DIAGNOSIS — I42.8 NICM (NONISCHEMIC CARDIOMYOPATHY) (HCC): ICD-10-CM

## 2023-10-02 DIAGNOSIS — I47.29 PAROXYSMAL VENTRICULAR TACHYCARDIA (HCC): ICD-10-CM

## 2023-10-02 DIAGNOSIS — I42.8 OTHER CARDIOMYOPATHY (HCC): Chronic | ICD-10-CM

## 2023-10-02 DIAGNOSIS — I50.22 CHRONIC SYSTOLIC CHF (CONGESTIVE HEART FAILURE) (HCC): ICD-10-CM

## 2023-10-02 DIAGNOSIS — Z95.810 BIVENTRICULAR ICD (IMPLANTABLE CARDIOVERTER-DEFIBRILLATOR) IN PLACE: Primary | ICD-10-CM

## 2023-10-02 DIAGNOSIS — I46.9 CARDIAC ARREST (HCC): ICD-10-CM

## 2023-10-02 DIAGNOSIS — I10 ESSENTIAL HYPERTENSION: ICD-10-CM

## 2023-10-02 PROCEDURE — 3079F DIAST BP 80-89 MM HG: CPT | Performed by: INTERNAL MEDICINE

## 2023-10-02 PROCEDURE — 93297 REM INTERROG DEV EVAL ICPMS: CPT | Performed by: NURSE PRACTITIONER

## 2023-10-02 PROCEDURE — 99214 OFFICE O/P EST MOD 30 MIN: CPT | Performed by: INTERNAL MEDICINE

## 2023-10-02 PROCEDURE — 93000 ELECTROCARDIOGRAM COMPLETE: CPT | Performed by: INTERNAL MEDICINE

## 2023-10-02 PROCEDURE — 93284 PRGRMG EVAL IMPLANTABLE DFB: CPT | Performed by: INTERNAL MEDICINE

## 2023-10-02 PROCEDURE — G2066 INTER DEVC REMOTE 30D: HCPCS | Performed by: NURSE PRACTITIONER

## 2023-10-02 PROCEDURE — 3074F SYST BP LT 130 MM HG: CPT | Performed by: INTERNAL MEDICINE

## 2023-10-31 PROCEDURE — 93296 REM INTERROG EVL PM/IDS: CPT | Performed by: INTERNAL MEDICINE

## 2023-10-31 PROCEDURE — 93295 DEV INTERROG REMOTE 1/2/MLT: CPT | Performed by: INTERNAL MEDICINE

## 2023-11-02 PROCEDURE — G2066 INTER DEVC REMOTE 30D: HCPCS | Performed by: NURSE PRACTITIONER

## 2023-11-02 PROCEDURE — 93297 REM INTERROG DEV EVAL ICPMS: CPT | Performed by: NURSE PRACTITIONER

## 2023-12-03 PROCEDURE — 93297 REM INTERROG DEV EVAL ICPMS: CPT | Performed by: CLINICAL NURSE SPECIALIST

## 2023-12-03 PROCEDURE — G2066 INTER DEVC REMOTE 30D: HCPCS | Performed by: CLINICAL NURSE SPECIALIST

## 2024-01-03 PROCEDURE — 93297 REM INTERROG DEV EVAL ICPMS: CPT | Performed by: NURSE PRACTITIONER

## 2024-01-30 PROCEDURE — 93295 DEV INTERROG REMOTE 1/2/MLT: CPT | Performed by: INTERNAL MEDICINE

## 2024-01-30 PROCEDURE — 93296 REM INTERROG EVL PM/IDS: CPT | Performed by: INTERNAL MEDICINE

## 2024-02-03 PROCEDURE — 93297 REM INTERROG DEV EVAL ICPMS: CPT | Performed by: NURSE PRACTITIONER

## 2024-04-08 NOTE — PROGRESS NOTES
[facility]   Electrophysiology  Office Note    Date: 4/23/2024    Chief Complaint   Patient presents with    Cardiomyopathy    Tachycardia    Hypertension     Cardiac HX: Luis Alberto Edward III is a 58 y.o. man with a h/o HTN, HLD, NICMP, chronic sCHF, EF as low as 40-50% (2012), NSVT, s/p cardiac arrest in [place] (2012), cath at that time showed no CAD, s/p SJM CRT-D (2012), s/p gen change (11/8/18, Dr. Hoang), echo (2021), EF 55%.     Interval History/HPI: Patient is here for follow-up for NSVT, cardiac arrest, and ICMP and CRT-D management.  Patient had been in [place] in 2012 on a business trip when he had a cardiac arrest.  His left heart cath at that time showed no CAD however his EF was low.  He was implanted with an SJM CRT-D in 2012.  He had a generator change in 2018.  He had a cardiac MRI at that time that showed a dilated cardiomyopathy with poor systolic function, no evidence of late contrast enhancement.  His EF improved to 55% in 2014.  Most recent echo was done in 2021 that showed his EF was 55%.  Patient presents today in AS, .   Review of his device today shows a sensed, V paced rhythm.  His QRS is 103.  He atrially paces 14% of the time and by ventricularly paces 98% of the time.  He had 2 mode switch episodes which appear to be an AT.  He had 1 NS episode that appears to be in SVT and also is part of the AMS episodes.  Patient was asymptomatic.  He has not felt any heart racing, palpitations or irregular heartbeats.  He remains on carvedilol 25 mg twice a day, lisinopril 5 mg daily.  His blood pressure is well-controlled in the office today.  His last echo was in 2021.  We discussed repeating his echo today.  He would like to proceed with repeating his echo as he is very active and sometimes rides his bike for long trips.  He rode 15 miles an hour the other day.  He is very active with skiing, bike riding etc.  He denies any chest pain, shortness of breath, PND, orthopnea or lower

## 2024-04-23 ENCOUNTER — NURSE ONLY (OUTPATIENT)
Dept: CARDIOLOGY CLINIC | Age: 59
End: 2024-04-23
Payer: COMMERCIAL

## 2024-04-23 ENCOUNTER — OFFICE VISIT (OUTPATIENT)
Dept: CARDIOLOGY CLINIC | Age: 59
End: 2024-04-23
Payer: COMMERCIAL

## 2024-04-23 VITALS
WEIGHT: 188.6 LBS | HEART RATE: 73 BPM | DIASTOLIC BLOOD PRESSURE: 76 MMHG | SYSTOLIC BLOOD PRESSURE: 114 MMHG | BODY MASS INDEX: 27.06 KG/M2

## 2024-04-23 DIAGNOSIS — I42.8 OTHER CARDIOMYOPATHY (HCC): Chronic | ICD-10-CM

## 2024-04-23 DIAGNOSIS — I47.29 PAROXYSMAL VENTRICULAR TACHYCARDIA (HCC): ICD-10-CM

## 2024-04-23 DIAGNOSIS — I47.10 SVT (SUPRAVENTRICULAR TACHYCARDIA) (HCC): ICD-10-CM

## 2024-04-23 DIAGNOSIS — Z86.74 H/O CARDIAC ARREST: ICD-10-CM

## 2024-04-23 DIAGNOSIS — Z95.810 AUTOMATIC IMPLANTABLE CARDIOVERTER-DEFIBRILLATOR IN SITU: Primary | ICD-10-CM

## 2024-04-23 DIAGNOSIS — I10 BENIGN ESSENTIAL HTN: ICD-10-CM

## 2024-04-23 DIAGNOSIS — I42.8 NICM (NONISCHEMIC CARDIOMYOPATHY) (HCC): Primary | ICD-10-CM

## 2024-04-23 DIAGNOSIS — I47.29 NSVT (NONSUSTAINED VENTRICULAR TACHYCARDIA) (HCC): ICD-10-CM

## 2024-04-23 DIAGNOSIS — I50.22 CHRONIC SYSTOLIC HEART FAILURE (HCC): ICD-10-CM

## 2024-04-23 DIAGNOSIS — I47.19 ATRIAL TACHYCARDIA (HCC): ICD-10-CM

## 2024-04-23 DIAGNOSIS — Z95.810 BIVENTRICULAR ICD (IMPLANTABLE CARDIOVERTER-DEFIBRILLATOR) IN PLACE: ICD-10-CM

## 2024-04-23 PROCEDURE — 3074F SYST BP LT 130 MM HG: CPT | Performed by: NURSE PRACTITIONER

## 2024-04-23 PROCEDURE — 3078F DIAST BP <80 MM HG: CPT | Performed by: NURSE PRACTITIONER

## 2024-04-23 PROCEDURE — 93000 ELECTROCARDIOGRAM COMPLETE: CPT | Performed by: NURSE PRACTITIONER

## 2024-04-23 PROCEDURE — 99214 OFFICE O/P EST MOD 30 MIN: CPT | Performed by: NURSE PRACTITIONER

## 2024-04-23 RX ORDER — SILDENAFIL 100 MG/1
TABLET, FILM COATED ORAL
COMMUNITY
Start: 2023-10-20

## 2024-04-24 PROCEDURE — 93284 PRGRMG EVAL IMPLANTABLE DFB: CPT | Performed by: INTERNAL MEDICINE

## 2024-05-08 ENCOUNTER — HOSPITAL ENCOUNTER (OUTPATIENT)
Dept: CARDIOLOGY | Age: 59
Discharge: HOME | End: 2024-05-10
Payer: COMMERCIAL

## 2024-05-08 VITALS
HEIGHT: 70 IN | WEIGHT: 180 LBS | SYSTOLIC BLOOD PRESSURE: 124 MMHG | BODY MASS INDEX: 25.77 KG/M2 | DIASTOLIC BLOOD PRESSURE: 74 MMHG

## 2024-05-08 DIAGNOSIS — I42.8 NICM (NONISCHEMIC CARDIOMYOPATHY) (HCC): ICD-10-CM

## 2024-05-08 PROCEDURE — 93306 TTE W/DOPPLER COMPLETE: CPT

## 2024-05-09 LAB
ECHO AO ASC DIAM: 3.3 CM
ECHO AO ASCENDING AORTA INDEX: 1.65 CM/M2
ECHO AO ROOT DIAM: 2.6 CM
ECHO AO ROOT INDEX: 1.3 CM/M2
ECHO AV AREA PEAK VELOCITY: 2.1 CM2
ECHO AV AREA VTI: 2 CM2
ECHO AV AREA/BSA PEAK VELOCITY: 1.1 CM2/M2
ECHO AV AREA/BSA VTI: 1 CM2/M2
ECHO AV CUSP MM: 2 CM
ECHO AV MEAN GRADIENT: 3 MMHG
ECHO AV MEAN VELOCITY: 0.9 M/S
ECHO AV PEAK GRADIENT: 6 MMHG
ECHO AV PEAK VELOCITY: 1.3 M/S
ECHO AV VELOCITY RATIO: 0.62
ECHO AV VTI: 28.4 CM
ECHO BSA: 2.01 M2
ECHO EST RA PRESSURE: 3 MMHG
ECHO LA AREA 2C: 18.3 CM2
ECHO LA AREA 4C: 18.4 CM2
ECHO LA MAJOR AXIS: 5.7 CM
ECHO LA MINOR AXIS: 4.9 CM
ECHO LA VOL BP: 55 ML (ref 18–58)
ECHO LA VOL MOD A2C: 55 ML (ref 18–58)
ECHO LA VOL MOD A4C: 47 ML (ref 18–58)
ECHO LA VOL/BSA BIPLANE: 28 ML/M2 (ref 16–34)
ECHO LA VOLUME INDEX MOD A2C: 28 ML/M2 (ref 16–34)
ECHO LA VOLUME INDEX MOD A4C: 24 ML/M2 (ref 16–34)
ECHO LV E' LATERAL VELOCITY: 8 CM/S
ECHO LV E' SEPTAL VELOCITY: 7 CM/S
ECHO LV EDV A2C: 65 ML
ECHO LV EDV A4C: 71 ML
ECHO LV EDV INDEX A4C: 36 ML/M2
ECHO LV EDV NDEX A2C: 33 ML/M2
ECHO LV EJECTION FRACTION A2C: 64 %
ECHO LV EJECTION FRACTION A4C: 45 %
ECHO LV EJECTION FRACTION BIPLANE: 57 % (ref 55–100)
ECHO LV ESV A2C: 23 ML
ECHO LV ESV A4C: 39 ML
ECHO LV ESV INDEX A2C: 12 ML/M2
ECHO LV ESV INDEX A4C: 20 ML/M2
ECHO LV FRACTIONAL SHORTENING: 31 % (ref 28–44)
ECHO LV INTERNAL DIMENSION DIASTOLE INDEX: 2.1 CM/M2
ECHO LV INTERNAL DIMENSION DIASTOLIC: 4.2 CM (ref 4.2–5.9)
ECHO LV INTERNAL DIMENSION SYSTOLIC INDEX: 1.45 CM/M2
ECHO LV INTERNAL DIMENSION SYSTOLIC: 2.9 CM
ECHO LV IVSD: 1 CM (ref 0.6–1)
ECHO LV MASS 2D: 137.2 G (ref 88–224)
ECHO LV MASS INDEX 2D: 68.6 G/M2 (ref 49–115)
ECHO LV POSTERIOR WALL DIASTOLIC: 1 CM (ref 0.6–1)
ECHO LV RELATIVE WALL THICKNESS RATIO: 0.48
ECHO LVOT AREA: 3.1 CM2
ECHO LVOT AV VTI INDEX: 0.63
ECHO LVOT DIAM: 2 CM
ECHO LVOT MEAN GRADIENT: 1 MMHG
ECHO LVOT PEAK GRADIENT: 3 MMHG
ECHO LVOT PEAK VELOCITY: 0.8 M/S
ECHO LVOT STROKE VOLUME INDEX: 28.3 ML/M2
ECHO LVOT SV: 56.5 ML
ECHO LVOT VTI: 18 CM
ECHO MV A VELOCITY: 0.54 M/S
ECHO MV AREA VTI: 2.1 CM2
ECHO MV E DECELERATION TIME (DT): 285 MS
ECHO MV E VELOCITY: 0.61 M/S
ECHO MV E/A RATIO: 1.13
ECHO MV E/E' LATERAL: 7.63
ECHO MV E/E' RATIO (AVERAGED): 8.17
ECHO MV E/E' SEPTAL: 8.71
ECHO MV LVOT VTI INDEX: 1.49
ECHO MV MAX VELOCITY: 0.8 M/S
ECHO MV MEAN GRADIENT: 1 MMHG
ECHO MV MEAN VELOCITY: 0.6 M/S
ECHO MV PEAK GRADIENT: 3 MMHG
ECHO MV VTI: 26.8 CM
ECHO PULMONARY ARTERY SYSTOLIC PRESSURE (PASP): 26 MMHG
ECHO PV MAX VELOCITY: 1 M/S
ECHO PV MEAN GRADIENT: 2 MMHG
ECHO PV MEAN VELOCITY: 0.7 M/S
ECHO PV PEAK GRADIENT: 4 MMHG
ECHO PV VTI: 22.1 CM
ECHO RA AREA 4C: 11.7 CM2
ECHO RA END SYSTOLIC VOLUME APICAL 4 CHAMBER INDEX BSA: 12 ML/M2
ECHO RA VOLUME: 24 ML
ECHO RIGHT VENTRICULAR SYSTOLIC PRESSURE (RVSP): 22 MMHG
ECHO RV INTERNAL DIMENSION: 3.3 CM
ECHO RV TAPSE: 2.6 CM (ref 1.7–?)
ECHO TV REGURGITANT MAX VELOCITY: 2.17 M/S
ECHO TV REGURGITANT PEAK GRADIENT: 19 MMHG

## 2024-07-10 DIAGNOSIS — I47.29 PAROXYSMAL VENTRICULAR TACHYCARDIA (HCC): ICD-10-CM

## 2024-07-10 DIAGNOSIS — I10 ESSENTIAL HYPERTENSION: ICD-10-CM

## 2024-07-10 RX ORDER — LISINOPRIL 5 MG/1
TABLET ORAL
Qty: 90 TABLET | Refills: 3 | Status: SHIPPED | OUTPATIENT
Start: 2024-07-10

## 2024-07-10 RX ORDER — CARVEDILOL 25 MG/1
25 TABLET ORAL 2 TIMES DAILY
Qty: 180 TABLET | Refills: 3 | Status: SHIPPED | OUTPATIENT
Start: 2024-07-10

## 2024-07-10 NOTE — TELEPHONE ENCOUNTER
Requested Prescriptions     Pending Prescriptions Disp Refills    lisinopril (PRINIVIL;ZESTRIL) 5 MG tablet 90 tablet 3     Sig: TAKE 1 TABLET BY MOUTH  DAILY    carvedilol (COREG) 25 MG tablet 180 tablet 3     Sig: Take 1 tablet by mouth 2 times daily            Checked Correct Pharmacy: Yes    Any changes since last refill? No     Number: 90 day    Refills: 3    Last Office Visit: 4/23/2024     Next Office Visit: 11/7/2024

## 2024-07-26 DIAGNOSIS — I50.22 CHRONIC SYSTOLIC HEART FAILURE (HCC): Primary | ICD-10-CM

## 2024-07-29 DIAGNOSIS — I10 ESSENTIAL HYPERTENSION: ICD-10-CM

## 2024-07-29 RX ORDER — LISINOPRIL 5 MG/1
TABLET ORAL
Qty: 90 TABLET | Refills: 3 | Status: SHIPPED | OUTPATIENT
Start: 2024-07-29

## 2024-07-29 NOTE — TELEPHONE ENCOUNTER
Requested Prescriptions     Pending Prescriptions Disp Refills    lisinopril (PRINIVIL;ZESTRIL) 5 MG tablet 90 tablet 2     Sig: TAKE 1 TABLET BY MOUTH  DAILY        Last Office Visit: 4/23/2024     Next Office Visit: 11/7/2024         COLETTE, s/p cardiac arrest (2012)  - EF now 55-60% (2021)  -   - NYHA class I  - On carvedilol 25 mg BID, lisinopril 5 mg daily    Note from 4/23/2024  
PAST MEDICAL HISTORY:  HLD (hyperlipidemia)     HTN, age 0-18     TBI (traumatic brain injury)

## 2024-09-16 PROCEDURE — 93297 REM INTERROG DEV EVAL ICPMS: CPT | Performed by: NURSE PRACTITIONER

## 2024-10-28 DIAGNOSIS — I50.22 CHRONIC SYSTOLIC HEART FAILURE (HCC): Primary | ICD-10-CM

## 2024-11-17 PROCEDURE — 93297 REM INTERROG DEV EVAL ICPMS: CPT | Performed by: NURSE PRACTITIONER

## 2024-11-25 ENCOUNTER — NURSE ONLY (OUTPATIENT)
Dept: CARDIOLOGY CLINIC | Age: 59
End: 2024-11-25

## 2024-11-25 ENCOUNTER — OFFICE VISIT (OUTPATIENT)
Dept: CARDIOLOGY CLINIC | Age: 59
End: 2024-11-25

## 2024-11-25 VITALS
SYSTOLIC BLOOD PRESSURE: 98 MMHG | HEIGHT: 70 IN | HEART RATE: 62 BPM | BODY MASS INDEX: 27.49 KG/M2 | WEIGHT: 192 LBS | DIASTOLIC BLOOD PRESSURE: 60 MMHG

## 2024-11-25 DIAGNOSIS — E78.2 MIXED HYPERLIPIDEMIA: ICD-10-CM

## 2024-11-25 DIAGNOSIS — I42.8 NICM (NONISCHEMIC CARDIOMYOPATHY) (HCC): Primary | ICD-10-CM

## 2024-11-25 DIAGNOSIS — I47.10 SVT (SUPRAVENTRICULAR TACHYCARDIA) (HCC): ICD-10-CM

## 2024-11-25 DIAGNOSIS — I47.29 NSVT (NONSUSTAINED VENTRICULAR TACHYCARDIA) (HCC): ICD-10-CM

## 2024-11-25 DIAGNOSIS — I42.8 OTHER CARDIOMYOPATHY (HCC): Chronic | ICD-10-CM

## 2024-11-25 DIAGNOSIS — Z95.810 ICD (IMPLANTABLE CARDIOVERTER-DEFIBRILLATOR) IN PLACE: ICD-10-CM

## 2024-11-25 DIAGNOSIS — Z95.810 AUTOMATIC IMPLANTABLE CARDIOVERTER-DEFIBRILLATOR IN SITU: Primary | ICD-10-CM

## 2024-11-25 DIAGNOSIS — I10 ESSENTIAL HYPERTENSION: ICD-10-CM

## 2024-11-25 DIAGNOSIS — Z86.74 HX OF CARDIAC ARREST: ICD-10-CM

## 2024-11-25 DIAGNOSIS — I50.22 CHRONIC SYSTOLIC HEART FAILURE (HCC): ICD-10-CM

## 2024-11-25 DIAGNOSIS — I47.29 PAROXYSMAL VENTRICULAR TACHYCARDIA (HCC): ICD-10-CM

## 2024-11-25 DIAGNOSIS — I47.19 PAT (PAROXYSMAL ATRIAL TACHYCARDIA) (HCC): ICD-10-CM

## 2024-11-25 RX ORDER — CARVEDILOL 25 MG/1
25 TABLET ORAL 2 TIMES DAILY
Qty: 180 TABLET | Refills: 3 | Status: SHIPPED | OUTPATIENT
Start: 2024-11-25

## 2024-11-25 RX ORDER — LISINOPRIL 5 MG/1
TABLET ORAL
Qty: 90 TABLET | Refills: 3 | Status: SHIPPED | OUTPATIENT
Start: 2024-11-25

## 2024-11-25 NOTE — PROGRESS NOTES
[place] [address]  [phone]    Primary Cardiologist: Dr Chamberlain    CC NICM/HTN/HLD/ICD    HPI:  59 y.o. with non ischemic cardiomyopathy (EF 40-50% now 55%), HTN, HLD, NSVT, hx cardiac arrest () and cath showed no CAD and CRT-D implanted.     BP 98/60. Typical -110/60s.     He has occasional orthostatic LH when bending over. He denies palpitations, syncope or falls. No cp, sob, edema, orthopnea, abdominal bloating, or early satiety. No n/v/d, fever or GI/ bleeding.     Past Medical History:   Diagnosis Date    Cardiomyopathy     EF 40-50%, now 55%. NICM    Hx of cardiac arrest     hx cardiac arrest. no CAD on cath, sp ICD    Hyperlipidemia     Hypertension     NICM (nonischemic cardiomyopathy) (HCC)     NSVT (nonsustained ventricular tachycardia) (HCC)     Paroxysmal ventricular tachycardia (HCC)      Past Surgical History:   Procedure Laterality Date    CARDIAC DEFIBRILLATOR PLACEMENT      HAND SURGERY      2015    TONSILLECTOMY AND ADENOIDECTOMY      childhood     Family History   Problem Relation Age of Onset    Cancer Mother     Cancer Father      Social History     Tobacco Use    Smoking status: Former     Current packs/day: 0.00     Types: Cigarettes     Quit date: 2007     Years since quittin.3    Smokeless tobacco: Never   Substance Use Topics    Alcohol use: Yes     Comment: socially    Drug use: No     Allergies:Patient has no known allergies.    Review of Systems  All 12 point review of symptoms completed. Pertinent positives identified in the HPI, all other review of symptoms negative.    Physical Exam:  BP 98/60   Pulse 62   Ht 1.778 m (5' 10\")   Wt 87.1 kg (192 lb)   BMI 27.55 kg/m²    General (appearance):  No acute distress  Eyes: anicteric   Neck: soft, No JVD  Ears/Nose/Mouth/Thorat: No cyanosis  CV: RRR   Respiratory:  clear  GI: soft, non-tender, non-distended  Skin: Warm, dry. No rashes  Neuro/Psych: Alert and

## 2025-01-10 DIAGNOSIS — I50.22 CHRONIC SYSTOLIC HEART FAILURE (HCC): Primary | ICD-10-CM

## 2025-01-14 DIAGNOSIS — I50.22 CHRONIC SYSTOLIC HEART FAILURE (HCC): Primary | ICD-10-CM

## 2025-06-25 PROCEDURE — 93297 REM INTERROG DEV EVAL ICPMS: CPT | Performed by: NURSE PRACTITIONER

## 2025-07-26 PROCEDURE — 93297 REM INTERROG DEV EVAL ICPMS: CPT | Performed by: INTERNAL MEDICINE
